# Patient Record
Sex: FEMALE | Race: WHITE | Employment: FULL TIME
[De-identification: names, ages, dates, MRNs, and addresses within clinical notes are randomized per-mention and may not be internally consistent; named-entity substitution may affect disease eponyms.]

---

## 2017-01-10 ENCOUNTER — OFFICE VISIT (OUTPATIENT)
Dept: NEUROLOGY | Facility: CLINIC | Age: 32
End: 2017-01-10

## 2017-01-10 VITALS
BODY MASS INDEX: 39.68 KG/M2 | WEIGHT: 293 LBS | DIASTOLIC BLOOD PRESSURE: 75 MMHG | SYSTOLIC BLOOD PRESSURE: 126 MMHG | RESPIRATION RATE: 17 BRPM | HEART RATE: 90 BPM | HEIGHT: 72 IN

## 2017-01-10 DIAGNOSIS — R51.9 FREQUENT HEADACHES: Primary | ICD-10-CM

## 2017-01-10 PROCEDURE — 99214 OFFICE O/P EST MOD 30 MIN: CPT | Performed by: PSYCHIATRY & NEUROLOGY

## 2017-02-01 ENCOUNTER — TELEPHONE (OUTPATIENT)
Dept: NEUROLOGY | Facility: CLINIC | Age: 32
End: 2017-02-01

## 2017-02-01 DIAGNOSIS — R51.9 FREQUENT HEADACHES: ICD-10-CM

## 2017-02-01 RX ORDER — ACETAZOLAMIDE 250 MG/1
500 TABLET ORAL 2 TIMES DAILY
Qty: 90 TABLET | Refills: 5 | Status: SHIPPED | OUTPATIENT
Start: 2017-02-01 | End: 2017-02-28 | Stop reason: SDUPTHER

## 2017-02-28 ENCOUNTER — OFFICE VISIT (OUTPATIENT)
Dept: NEUROLOGY | Facility: CLINIC | Age: 32
End: 2017-02-28

## 2017-02-28 VITALS — DIASTOLIC BLOOD PRESSURE: 94 MMHG | RESPIRATION RATE: 17 BRPM | SYSTOLIC BLOOD PRESSURE: 154 MMHG | HEART RATE: 94 BPM

## 2017-02-28 DIAGNOSIS — R51.9 FREQUENT HEADACHES: ICD-10-CM

## 2017-02-28 DIAGNOSIS — R51.9 FREQUENT HEADACHES: Primary | ICD-10-CM

## 2017-02-28 PROCEDURE — 99212 OFFICE O/P EST SF 10 MIN: CPT | Performed by: PSYCHIATRY & NEUROLOGY

## 2017-02-28 PROCEDURE — G8427 DOCREV CUR MEDS BY ELIG CLIN: HCPCS | Performed by: PSYCHIATRY & NEUROLOGY

## 2017-02-28 PROCEDURE — G8417 CALC BMI ABV UP PARAM F/U: HCPCS | Performed by: PSYCHIATRY & NEUROLOGY

## 2017-02-28 RX ORDER — ACETAZOLAMIDE 250 MG/1
500 TABLET ORAL 2 TIMES DAILY
Qty: 180 TABLET | Refills: 5 | Status: SHIPPED | OUTPATIENT
Start: 2017-02-28 | End: 2017-05-01 | Stop reason: ALTCHOICE

## 2017-03-03 ENCOUNTER — TELEPHONE (OUTPATIENT)
Dept: NEUROLOGY | Facility: CLINIC | Age: 32
End: 2017-03-03

## 2017-03-03 DIAGNOSIS — R51.9 FREQUENT HEADACHES: ICD-10-CM

## 2017-05-01 PROBLEM — G93.2 PSEUDOTUMOR CEREBRI: Status: ACTIVE | Noted: 2017-05-01

## 2018-10-22 ENCOUNTER — HOSPITAL ENCOUNTER (OUTPATIENT)
Age: 33
Discharge: HOME | End: 2018-10-22
Payer: COMMERCIAL

## 2018-10-22 ENCOUNTER — INITIAL PRENATAL (OUTPATIENT)
Dept: OBGYN | Age: 33
End: 2018-10-22

## 2018-10-22 ENCOUNTER — HOSPITAL ENCOUNTER (OUTPATIENT)
Age: 33
Setting detail: SPECIMEN
Discharge: HOME | End: 2018-10-22
Payer: COMMERCIAL

## 2018-10-22 VITALS — DIASTOLIC BLOOD PRESSURE: 84 MMHG | BODY MASS INDEX: 43.78 KG/M2 | SYSTOLIC BLOOD PRESSURE: 128 MMHG | WEIGHT: 293 LBS

## 2018-10-22 DIAGNOSIS — Z34.91 ENCOUNTER FOR SUPERVISION OF NORMAL PREGNANCY IN FIRST TRIMESTER, UNSPECIFIED GRAVIDITY: ICD-10-CM

## 2018-10-22 DIAGNOSIS — Z32.01 POSITIVE URINE PREGNANCY TEST: ICD-10-CM

## 2018-10-22 DIAGNOSIS — N91.2 AMENORRHEA: Primary | ICD-10-CM

## 2018-10-22 DIAGNOSIS — N91.2 AMENORRHEA: ICD-10-CM

## 2018-10-22 LAB
ABO/RH: NORMAL
AMPHETAMINE SCREEN URINE: NEGATIVE
ANTIBODY SCREEN: NEGATIVE
BARBITURATE SCREEN URINE: NEGATIVE
BENZODIAZEPINE SCREEN, URINE: NEGATIVE
BUPRENORPHINE URINE: NEGATIVE
CANNABINOID SCREEN URINE: NEGATIVE
COCAINE METABOLITE, URINE: NEGATIVE
CONTROL: ABNORMAL
HEPATITIS C ANTIBODY: NONREACTIVE
HIV AG/AB: NONREACTIVE
MDMA URINE: NORMAL
METHADONE SCREEN, URINE: NEGATIVE
METHAMPHETAMINE, URINE: NEGATIVE
OPIATES, URINE: NEGATIVE
OXYCODONE SCREEN URINE: NEGATIVE
PHENCYCLIDINE, URINE: NEGATIVE
PREGNANCY TEST URINE, POC: POSITIVE
PROPOXYPHENE, URINE: NEGATIVE
TEST INFORMATION: NORMAL
TRICYCLIC ANTIDEPRESSANTS, UR: NEGATIVE

## 2018-10-22 PROCEDURE — 86762 RUBELLA ANTIBODY: CPT

## 2018-10-22 PROCEDURE — 80306 DRUG TEST PRSMV INSTRMNT: CPT

## 2018-10-22 PROCEDURE — 86780 TREPONEMA PALLIDUM: CPT

## 2018-10-22 PROCEDURE — 87491 CHLMYD TRACH DNA AMP PROBE: CPT

## 2018-10-22 PROCEDURE — 36415 COLL VENOUS BLD VENIPUNCTURE: CPT

## 2018-10-22 PROCEDURE — 87591 N.GONORRHOEAE DNA AMP PROB: CPT

## 2018-10-22 PROCEDURE — 87340 HEPATITIS B SURFACE AG IA: CPT

## 2018-10-22 PROCEDURE — 85025 COMPLETE CBC W/AUTO DIFF WBC: CPT

## 2018-10-22 PROCEDURE — 86850 RBC ANTIBODY SCREEN: CPT

## 2018-10-22 PROCEDURE — G8417 CALC BMI ABV UP PARAM F/U: HCPCS | Performed by: ADVANCED PRACTICE MIDWIFE

## 2018-10-22 PROCEDURE — 86900 BLOOD TYPING SEROLOGIC ABO: CPT

## 2018-10-22 PROCEDURE — 0500F INITIAL PRENATAL CARE VISIT: CPT | Performed by: ADVANCED PRACTICE MIDWIFE

## 2018-10-22 PROCEDURE — G8427 DOCREV CUR MEDS BY ELIG CLIN: HCPCS | Performed by: ADVANCED PRACTICE MIDWIFE

## 2018-10-22 PROCEDURE — 87389 HIV-1 AG W/HIV-1&-2 AB AG IA: CPT

## 2018-10-22 PROCEDURE — 86901 BLOOD TYPING SEROLOGIC RH(D): CPT

## 2018-10-22 PROCEDURE — 87086 URINE CULTURE/COLONY COUNT: CPT

## 2018-10-22 PROCEDURE — 86803 HEPATITIS C AB TEST: CPT

## 2018-10-22 ASSESSMENT — PATIENT HEALTH QUESTIONNAIRE - PHQ9
SUM OF ALL RESPONSES TO PHQ QUESTIONS 1-9: 0
2. FEELING DOWN, DEPRESSED OR HOPELESS: 0
1. LITTLE INTEREST OR PLEASURE IN DOING THINGS: 0
SUM OF ALL RESPONSES TO PHQ9 QUESTIONS 1 & 2: 0
SUM OF ALL RESPONSES TO PHQ QUESTIONS 1-9: 0

## 2018-10-23 ENCOUNTER — TELEPHONE (OUTPATIENT)
Dept: OBGYN | Age: 33
End: 2018-10-23

## 2018-10-23 LAB
-: NORMAL
CULTURE: NORMAL
Lab: NORMAL
REASON FOR REJECTION: NORMAL
SPECIMEN DESCRIPTION: NORMAL
STATUS: NORMAL
ZZ NTE CLEAN UP: ORDERED TEST: NORMAL
ZZ NTE WITH NAME CLEAN UP: SPECIMEN SOURCE: NORMAL

## 2018-10-24 DIAGNOSIS — Z3A.08 8 WEEKS GESTATION OF PREGNANCY: Primary | ICD-10-CM

## 2018-10-24 LAB
ABSOLUTE EOS #: 0.04 K/UL (ref 0–0.44)
ABSOLUTE IMMATURE GRANULOCYTE: <0.03 K/UL (ref 0–0.3)
ABSOLUTE LYMPH #: 1.4 K/UL (ref 1.1–3.7)
ABSOLUTE MONO #: 0.5 K/UL (ref 0.1–1.2)
BASOPHILS # BLD: 1 % (ref 0–2)
BASOPHILS ABSOLUTE: 0.04 K/UL (ref 0–0.2)
DIFFERENTIAL TYPE: ABNORMAL
EOSINOPHILS RELATIVE PERCENT: 1 % (ref 1–4)
HCT VFR BLD CALC: 38.3 % (ref 36.3–47.1)
HEMOGLOBIN: 12 G/DL (ref 11.9–15.1)
HEPATITIS B SURFACE ANTIGEN: NONREACTIVE
IMMATURE GRANULOCYTES: 0 %
LYMPHOCYTES # BLD: 18 % (ref 24–43)
MCH RBC QN AUTO: 27.3 PG (ref 25.2–33.5)
MCHC RBC AUTO-ENTMCNC: 31.3 G/DL (ref 28.4–34.8)
MCV RBC AUTO: 87.2 FL (ref 82.6–102.9)
MONOCYTES # BLD: 7 % (ref 3–12)
NRBC AUTOMATED: 0 PER 100 WBC
PDW BLD-RTO: 13.7 % (ref 11.8–14.4)
PLATELET # BLD: 344 K/UL (ref 138–453)
PLATELET ESTIMATE: ABNORMAL
PMV BLD AUTO: 8.6 FL (ref 8.1–13.5)
RBC # BLD: 4.39 M/UL (ref 3.95–5.11)
RBC # BLD: ABNORMAL 10*6/UL
RUBV IGG SER QL: 161.7 IU/ML
SEG NEUTROPHILS: 73 % (ref 36–65)
SEGMENTED NEUTROPHILS ABSOLUTE COUNT: 5.67 K/UL (ref 1.5–8.1)
T. PALLIDUM, IGG: NONREACTIVE
WBC # BLD: 7.7 K/UL (ref 3.5–11.3)
WBC # BLD: ABNORMAL 10*3/UL

## 2018-11-07 ENCOUNTER — OFFICE VISIT (OUTPATIENT)
Dept: OBGYN | Age: 33
End: 2018-11-07
Payer: COMMERCIAL

## 2018-11-07 VITALS — WEIGHT: 293 LBS | DIASTOLIC BLOOD PRESSURE: 76 MMHG | BODY MASS INDEX: 42.99 KG/M2 | SYSTOLIC BLOOD PRESSURE: 118 MMHG

## 2018-11-07 DIAGNOSIS — E86.0 DEHYDRATION: Primary | ICD-10-CM

## 2018-11-07 DIAGNOSIS — O21.9 NAUSEA AND VOMITING IN PREGNANCY: Primary | ICD-10-CM

## 2018-11-07 LAB
BILIRUBIN, POC: NORMAL
BLOOD URINE, POC: NORMAL
CLARITY, POC: NORMAL
COLOR, POC: NORMAL
GLUCOSE URINE, POC: NEGATIVE
KETONES, POC: NORMAL
LEUKOCYTE EST, POC: NEGATIVE
NITRITE, POC: NEGATIVE
PH, POC: 5.5
PROTEIN, POC: NORMAL
SPECIFIC GRAVITY, POC: 1.02
UROBILINOGEN, POC: 0.2

## 2018-11-07 PROCEDURE — G8417 CALC BMI ABV UP PARAM F/U: HCPCS | Performed by: ADVANCED PRACTICE MIDWIFE

## 2018-11-07 PROCEDURE — G8428 CUR MEDS NOT DOCUMENT: HCPCS | Performed by: ADVANCED PRACTICE MIDWIFE

## 2018-11-07 PROCEDURE — 99211 OFF/OP EST MAY X REQ PHY/QHP: CPT | Performed by: ADVANCED PRACTICE MIDWIFE

## 2018-11-07 PROCEDURE — 81002 URINALYSIS NONAUTO W/O SCOPE: CPT | Performed by: ADVANCED PRACTICE MIDWIFE

## 2018-11-07 RX ORDER — PROMETHAZINE HYDROCHLORIDE 25 MG/1
25 TABLET ORAL 4 TIMES DAILY PRN
Qty: 20 TABLET | Refills: 1 | Status: SHIPPED | OUTPATIENT
Start: 2018-11-07 | End: 2018-11-14

## 2018-11-26 ENCOUNTER — PROCEDURE VISIT (OUTPATIENT)
Dept: OBGYN | Age: 33
End: 2018-11-26
Payer: COMMERCIAL

## 2018-11-26 ENCOUNTER — HOSPITAL ENCOUNTER (OUTPATIENT)
Age: 33
Setting detail: SPECIMEN
Discharge: HOME | End: 2018-11-26
Payer: COMMERCIAL

## 2018-11-26 DIAGNOSIS — O36.80X0 PREGNANCY WITH INCONCLUSIVE FETAL VIABILITY, NOT APPLICABLE OR UNSPECIFIED FETUS: Primary | ICD-10-CM

## 2018-11-26 DIAGNOSIS — Z34.02 ENCOUNTER FOR SUPERVISION OF NORMAL FIRST PREGNANCY IN SECOND TRIMESTER: ICD-10-CM

## 2018-11-26 DIAGNOSIS — R82.90 ABNORMAL URINE: ICD-10-CM

## 2018-11-26 DIAGNOSIS — Z3A.10 10 WEEKS GESTATION OF PREGNANCY: ICD-10-CM

## 2018-11-26 DIAGNOSIS — Z34.02 ENCOUNTER FOR SUPERVISION OF NORMAL FIRST PREGNANCY IN SECOND TRIMESTER: Primary | ICD-10-CM

## 2018-11-26 LAB
CRL: 3.3 CM
SAC DIAMETER: NORMAL CM

## 2018-11-26 PROCEDURE — 87491 CHLMYD TRACH DNA AMP PROBE: CPT

## 2018-11-26 PROCEDURE — 87591 N.GONORRHOEAE DNA AMP PROB: CPT

## 2018-11-26 PROCEDURE — 87086 URINE CULTURE/COLONY COUNT: CPT

## 2018-11-26 PROCEDURE — 76817 TRANSVAGINAL US OBSTETRIC: CPT | Performed by: OBSTETRICS & GYNECOLOGY

## 2018-11-27 LAB
CULTURE: NORMAL
Lab: NORMAL
SPECIMEN DESCRIPTION: NORMAL
STATUS: NORMAL

## 2018-11-28 LAB
C. TRACHOMATIS DNA ,URINE: NEGATIVE
N. GONORRHOEAE DNA, URINE: NEGATIVE

## 2018-12-14 ENCOUNTER — ROUTINE PRENATAL (OUTPATIENT)
Dept: OBGYN | Age: 33
End: 2018-12-14

## 2018-12-14 VITALS — WEIGHT: 293 LBS | BODY MASS INDEX: 42.31 KG/M2 | DIASTOLIC BLOOD PRESSURE: 86 MMHG | SYSTOLIC BLOOD PRESSURE: 138 MMHG

## 2018-12-14 DIAGNOSIS — Z3A.12 12 WEEKS GESTATION OF PREGNANCY: ICD-10-CM

## 2018-12-14 DIAGNOSIS — Z34.01 ENCOUNTER FOR SUPERVISION OF NORMAL FIRST PREGNANCY IN FIRST TRIMESTER: Primary | ICD-10-CM

## 2018-12-14 PROCEDURE — 0502F SUBSEQUENT PRENATAL CARE: CPT | Performed by: ADVANCED PRACTICE MIDWIFE

## 2018-12-14 RX ORDER — ONDANSETRON 4 MG/1
4 TABLET, FILM COATED ORAL EVERY 8 HOURS PRN
COMMUNITY
End: 2019-01-07 | Stop reason: SDUPTHER

## 2018-12-14 RX ORDER — RANITIDINE 150 MG/1
150 TABLET ORAL 2 TIMES DAILY
Status: ON HOLD | COMMUNITY
End: 2019-03-11

## 2018-12-17 NOTE — PROGRESS NOTES
Fum-FA-DHA (PRENATAL 1 PO), ranitidine, and ondansetron. Return in about 4 weeks (around 1/11/2019) for ob. There are no Patient Instructions on file for this visit.           Charli Officer Luis Fernando,12/16/2018 9:56 PM

## 2018-12-27 RX ORDER — ONDANSETRON 4 MG/1
4 TABLET, FILM COATED ORAL EVERY 8 HOURS PRN
Qty: 30 TABLET | Refills: 0 | Status: ON HOLD | OUTPATIENT
Start: 2018-12-27 | End: 2019-06-27 | Stop reason: HOSPADM

## 2019-01-07 ENCOUNTER — ROUTINE PRENATAL (OUTPATIENT)
Dept: OBGYN | Age: 34
End: 2019-01-07

## 2019-01-07 VITALS — BODY MASS INDEX: 42.72 KG/M2 | SYSTOLIC BLOOD PRESSURE: 124 MMHG | WEIGHT: 293 LBS | DIASTOLIC BLOOD PRESSURE: 84 MMHG

## 2019-01-07 DIAGNOSIS — Z36.89 ENCOUNTER FOR OTHER SPECIFIED ANTENATAL SCREENING: ICD-10-CM

## 2019-01-07 DIAGNOSIS — Z34.02 ENCOUNTER FOR SUPERVISION OF NORMAL FIRST PREGNANCY IN SECOND TRIMESTER: Primary | ICD-10-CM

## 2019-01-07 DIAGNOSIS — Z3A.16 16 WEEKS GESTATION OF PREGNANCY: ICD-10-CM

## 2019-01-07 PROCEDURE — 0502F SUBSEQUENT PRENATAL CARE: CPT | Performed by: ADVANCED PRACTICE MIDWIFE

## 2019-01-07 RX ORDER — CALCIUM CARBONATE 200(500)MG
1 TABLET,CHEWABLE ORAL DAILY
COMMUNITY
End: 2019-09-10

## 2019-01-07 RX ORDER — LANOLIN ALCOHOL/MO/W.PET/CERES
50 CREAM (GRAM) TOPICAL DAILY
Status: ON HOLD | COMMUNITY
End: 2019-06-27 | Stop reason: HOSPADM

## 2019-01-14 DIAGNOSIS — Z3A.12 12 WEEKS GESTATION OF PREGNANCY: ICD-10-CM

## 2019-01-14 DIAGNOSIS — Z36.89 ENCOUNTER FOR OTHER SPECIFIED ANTENATAL SCREENING: ICD-10-CM

## 2019-02-04 ENCOUNTER — ROUTINE PRENATAL (OUTPATIENT)
Dept: OBGYN | Age: 34
End: 2019-02-04
Payer: COMMERCIAL

## 2019-02-04 VITALS — BODY MASS INDEX: 42.18 KG/M2 | SYSTOLIC BLOOD PRESSURE: 120 MMHG | DIASTOLIC BLOOD PRESSURE: 82 MMHG | WEIGHT: 293 LBS

## 2019-02-04 DIAGNOSIS — O99.212 OBESITY AFFECTING PREGNANCY IN SECOND TRIMESTER: ICD-10-CM

## 2019-02-04 DIAGNOSIS — Z3A.20 20 WEEKS GESTATION OF PREGNANCY: ICD-10-CM

## 2019-02-04 DIAGNOSIS — Z36.3 ANTENATAL SCREENING FOR MALFORMATION USING ULTRASONICS: Primary | ICD-10-CM

## 2019-02-04 LAB
ABDOMINAL CIRCUMFERENCE: 15.8 CM
BIPARIETAL DIAMETER: 5 CM
ESTIMATED FETAL WEIGHT: 367 GRAMS
FEMORAL DIAMETER: NORMAL CM
HC/AC: 1.13
HEAD CIRCUMFERENCE: 17.9 CM

## 2019-02-04 PROCEDURE — 0502F SUBSEQUENT PRENATAL CARE: CPT | Performed by: ADVANCED PRACTICE MIDWIFE

## 2019-02-04 PROCEDURE — 76805 OB US >/= 14 WKS SNGL FETUS: CPT | Performed by: OBSTETRICS & GYNECOLOGY

## 2019-03-04 ENCOUNTER — ROUTINE PRENATAL (OUTPATIENT)
Dept: OBGYN | Age: 34
End: 2019-03-04

## 2019-03-04 VITALS — SYSTOLIC BLOOD PRESSURE: 126 MMHG | BODY MASS INDEX: 42.86 KG/M2 | DIASTOLIC BLOOD PRESSURE: 80 MMHG | WEIGHT: 293 LBS

## 2019-03-04 DIAGNOSIS — Z34.02 ENCOUNTER FOR SUPERVISION OF NORMAL FIRST PREGNANCY IN SECOND TRIMESTER: Primary | ICD-10-CM

## 2019-03-04 DIAGNOSIS — Z3A.24 24 WEEKS GESTATION OF PREGNANCY: ICD-10-CM

## 2019-03-04 PROCEDURE — 0502F SUBSEQUENT PRENATAL CARE: CPT | Performed by: ADVANCED PRACTICE MIDWIFE

## 2019-03-11 ENCOUNTER — APPOINTMENT (OUTPATIENT)
Dept: ULTRASOUND IMAGING | Age: 34
End: 2019-03-11
Payer: COMMERCIAL

## 2019-03-11 ENCOUNTER — HOSPITAL ENCOUNTER (OUTPATIENT)
Age: 34
Discharge: HOME | End: 2019-03-11
Attending: ADVANCED PRACTICE MIDWIFE | Admitting: ADVANCED PRACTICE MIDWIFE
Payer: COMMERCIAL

## 2019-03-11 ENCOUNTER — TELEPHONE (OUTPATIENT)
Dept: OBGYN | Age: 34
End: 2019-03-11

## 2019-03-11 VITALS
TEMPERATURE: 97.8 F | SYSTOLIC BLOOD PRESSURE: 131 MMHG | RESPIRATION RATE: 16 BRPM | HEART RATE: 79 BPM | DIASTOLIC BLOOD PRESSURE: 63 MMHG

## 2019-03-11 LAB
-: ABNORMAL
AMORPHOUS: ABNORMAL
BACTERIA: ABNORMAL
BILIRUBIN URINE: NEGATIVE
CASTS UA: ABNORMAL /LPF
COLOR: YELLOW
COMMENT UA: ABNORMAL
CRYSTALS, UA: ABNORMAL /HPF
CRYSTALS, UA: ABNORMAL /HPF
EPITHELIAL CELLS UA: ABNORMAL /HPF (ref 0–25)
GLUCOSE URINE: NEGATIVE
KETONES, URINE: NEGATIVE
LEUKOCYTE ESTERASE, URINE: ABNORMAL
MUCUS: ABNORMAL
NITRITE, URINE: NEGATIVE
OTHER OBSERVATIONS UA: ABNORMAL
PH UA: 6 (ref 5–9)
PROTEIN UA: NEGATIVE
RBC UA: ABNORMAL /HPF (ref 0–2)
RENAL EPITHELIAL, UA: ABNORMAL /HPF
SPECIFIC GRAVITY UA: >1.03 (ref 1.01–1.02)
TRICHOMONAS: ABNORMAL
TURBIDITY: CLEAR
URINE HGB: ABNORMAL
UROBILINOGEN, URINE: NORMAL
WBC UA: ABNORMAL /HPF (ref 0–5)
YEAST: ABNORMAL

## 2019-03-11 PROCEDURE — 81001 URINALYSIS AUTO W/SCOPE: CPT

## 2019-03-11 PROCEDURE — 87086 URINE CULTURE/COLONY COUNT: CPT

## 2019-03-11 PROCEDURE — 76817 TRANSVAGINAL US OBSTETRIC: CPT

## 2019-03-11 PROCEDURE — 6370000000 HC RX 637 (ALT 250 FOR IP): Performed by: ADVANCED PRACTICE MIDWIFE

## 2019-03-11 RX ORDER — PANTOPRAZOLE SODIUM 40 MG/1
40 TABLET, DELAYED RELEASE ORAL DAILY
COMMUNITY

## 2019-03-11 RX ORDER — CEPHALEXIN 250 MG/1
500 CAPSULE ORAL ONCE
Status: COMPLETED | OUTPATIENT
Start: 2019-03-11 | End: 2019-03-11

## 2019-03-11 RX ADMIN — CEPHALEXIN 500 MG: 250 CAPSULE ORAL at 22:15

## 2019-03-12 ENCOUNTER — TELEPHONE (OUTPATIENT)
Dept: OBGYN | Age: 34
End: 2019-03-12

## 2019-03-12 DIAGNOSIS — R31.9 HEMATURIA, UNSPECIFIED TYPE: Primary | ICD-10-CM

## 2019-03-12 DIAGNOSIS — R82.90 ABNORMAL URINALYSIS: Primary | ICD-10-CM

## 2019-03-12 LAB
CULTURE: NORMAL
Lab: NORMAL
SPECIMEN DESCRIPTION: NORMAL

## 2019-03-12 RX ORDER — CEPHALEXIN 500 MG/1
500 CAPSULE ORAL 3 TIMES DAILY
Qty: 21 CAPSULE | Refills: 0 | Status: SHIPPED | OUTPATIENT
Start: 2019-03-12 | End: 2019-03-19

## 2019-03-19 DIAGNOSIS — R31.9 HEMATURIA, UNSPECIFIED TYPE: ICD-10-CM

## 2019-04-04 ENCOUNTER — ROUTINE PRENATAL (OUTPATIENT)
Dept: OBGYN | Age: 34
End: 2019-04-04

## 2019-04-04 VITALS — BODY MASS INDEX: 43.67 KG/M2 | WEIGHT: 293 LBS | DIASTOLIC BLOOD PRESSURE: 78 MMHG | SYSTOLIC BLOOD PRESSURE: 126 MMHG

## 2019-04-04 DIAGNOSIS — Z34.03 ENCOUNTER FOR SUPERVISION OF NORMAL FIRST PREGNANCY IN THIRD TRIMESTER: Primary | ICD-10-CM

## 2019-04-04 DIAGNOSIS — Z3A.28 28 WEEKS GESTATION OF PREGNANCY: ICD-10-CM

## 2019-04-04 LAB
CHP ED QC CHECK: NORMAL
GLUCOSE BLD-MCNC: 128 MG/DL
HGB, POC: 10

## 2019-04-04 PROCEDURE — 0502F SUBSEQUENT PRENATAL CARE: CPT | Performed by: ADVANCED PRACTICE MIDWIFE

## 2019-04-04 NOTE — PROGRESS NOTES
Kiara Garcia is here at 28w5d for:    Chief Complaint   Patient presents with    Routine Prenatal Visit     1 hour GTT. Estimated Due Date: Estimated Date of Delivery: 19    OB History    Para Term  AB Living   1             SAB TAB Ectopic Molar Multiple Live Births                    # Outcome Date GA Lbr Wallace/2nd Weight Sex Delivery Anes PTL Lv   1 Current                 Past Medical History:   Diagnosis Date    Asthma     Attention deficit hyperactivity disorder     GERD (gastroesophageal reflux disease)     Hypertension     intercranial hypertension     Pseudotumor cerebri     Psychiatric problem     Anxiety       Past Surgical History:   Procedure Laterality Date    CHOLECYSTECTOMY      2018       Social History     Tobacco Use   Smoking Status Never Smoker   Smokeless Tobacco Never Used        Social History     Substance and Sexual Activity   Alcohol Use No       No results found for this visit on 19. Vitals:  /78   Wt (!) 322 lb (146.1 kg)   LMP 2018 (Approximate)   BMI 43.67 kg/m²   Estimated body mass index is 43.67 kg/m² as calculated from the following:    Height as of 17: 6' (1.829 m). Weight as of this encounter: 322 lb (146.1 kg). HPI: here for routine ob visit and gtt     PT denies fever, chills, nausea and vomiting       Abdomen: enlarged, gravid, soft, nontender     Results reviewed today:    No results found for this visit on 19. See prenatal vital sign section and fetal assessment section    ASSESSMENT & Plan    Diagnosis Orders   1. Encounter for supervision of normal first pregnancy in third trimester     2. 28 weeks gestation of pregnancy               I am having Kiara Garcia maintain her albuterol sulfate HFA, Prenatal MV-Min-Fe Fum-FA-DHA (PRENATAL 1 PO), ondansetron, vitamin B-6, calcium carbonate, and pantoprazole. Return in about 2 weeks (around 2019) for ob 30wkUS+tdap.     There are no Patient Instructions on file for this visit.           Amos Gould,4/4/2019 3:34 PM

## 2019-04-15 ENCOUNTER — TELEPHONE (OUTPATIENT)
Dept: OBGYN | Age: 34
End: 2019-04-15

## 2019-04-15 DIAGNOSIS — D64.9 ANEMIA, UNSPECIFIED TYPE: Primary | ICD-10-CM

## 2019-04-15 RX ORDER — FERROUS SULFATE 325(65) MG
325 TABLET ORAL 2 TIMES DAILY
Qty: 60 TABLET | Refills: 5 | Status: ON HOLD | OUTPATIENT
Start: 2019-04-15 | End: 2019-06-27 | Stop reason: HOSPADM

## 2019-04-15 RX ORDER — FERROUS SULFATE 325(65) MG
325 TABLET ORAL 2 TIMES DAILY
Qty: 60 TABLET | Refills: 0 | Status: SHIPPED | OUTPATIENT
Start: 2019-04-15 | End: 2019-09-10 | Stop reason: ALTCHOICE

## 2019-04-15 NOTE — TELEPHONE ENCOUNTER
Patient concerned about anemia. Was with cousin who said her gums and lips looked pale. Prescription for iron?

## 2019-04-15 NOTE — TELEPHONE ENCOUNTER
Patient called and said she needs 30 day prescription sent to [facility] and additional prescription sent to [facility].

## 2019-04-15 NOTE — TELEPHONE ENCOUNTER
Make sure she's taking a PNV with iron.  So that cant be gummies, if she is then add ferrous sulfate 325 BID

## 2019-04-15 NOTE — TELEPHONE ENCOUNTER
Left patient message with advice and asking her to call me to let me know what pharmacy to send prescription to.

## 2019-04-22 ENCOUNTER — ROUTINE PRENATAL (OUTPATIENT)
Dept: OBGYN | Age: 34
End: 2019-04-22
Payer: COMMERCIAL

## 2019-04-22 VITALS — BODY MASS INDEX: 44.21 KG/M2 | SYSTOLIC BLOOD PRESSURE: 124 MMHG | DIASTOLIC BLOOD PRESSURE: 80 MMHG | WEIGHT: 293 LBS

## 2019-04-22 DIAGNOSIS — Z34.03 ENCOUNTER FOR SUPERVISION OF NORMAL FIRST PREGNANCY IN THIRD TRIMESTER: ICD-10-CM

## 2019-04-22 DIAGNOSIS — O26.843 UTERINE SIZE-DATE DISCREPANCY IN THIRD TRIMESTER: Primary | ICD-10-CM

## 2019-04-22 DIAGNOSIS — Z3A.31 31 WEEKS GESTATION OF PREGNANCY: ICD-10-CM

## 2019-04-22 LAB
ABDOMINAL CIRCUMFERENCE: 27.5 CM
BIPARIETAL DIAMETER: 8.4 CM
ESTIMATED FETAL WEIGHT: 1950 GRAMS
FEMORAL DIAMETER: NORMAL CM
HC/AC: 1.1
HEAD CIRCUMFERENCE: 30.3 CM

## 2019-04-22 PROCEDURE — 76816 OB US FOLLOW-UP PER FETUS: CPT | Performed by: OBSTETRICS & GYNECOLOGY

## 2019-04-22 PROCEDURE — 0502F SUBSEQUENT PRENATAL CARE: CPT | Performed by: ADVANCED PRACTICE MIDWIFE

## 2019-04-22 NOTE — PROGRESS NOTES
Kiara Garcia is here at 31w2d for:    Chief Complaint   Patient presents with    Routine Prenatal Visit     Follow up in house ultrasound. Thick white vaginal discharge , no itch or irritation. Vomits 3-4 times pe week. Estimated Due Date: Estimated Date of Delivery: 19    OB History    Para Term  AB Living   1             SAB TAB Ectopic Molar Multiple Live Births                    # Outcome Date GA Lbr Wallace/2nd Weight Sex Delivery Anes PTL Lv   1 Current                 Past Medical History:   Diagnosis Date    Asthma     Attention deficit hyperactivity disorder     GERD (gastroesophageal reflux disease)     Hypertension     intercranial hypertension     Pseudotumor cerebri     Psychiatric problem     Anxiety       Past Surgical History:   Procedure Laterality Date    CHOLECYSTECTOMY      2018       Social History     Tobacco Use   Smoking Status Never Smoker   Smokeless Tobacco Never Used        Social History     Substance and Sexual Activity   Alcohol Use No       Results for orders placed or performed in visit on 19    OB Follow Up Transabdominal Approach   Result Value Ref Range    Biparietal Diameter 8.4 cm    Abdominal Circumference 27.5 cm    Femoral Diameter  cm    Head Circumference 30.3 cm    HC/AC 1.10     Estimated Fetal Weight 1,950 grams       Vitals:  /80   Wt (!) 326 lb (147.9 kg)   LMP 2018 (Approximate)   BMI 44.21 kg/m²   Estimated body mass index is 44.21 kg/m² as calculated from the following:    Height as of 17: 6' (1.829 m). Weight as of this encounter: 326 lb (147.9 kg). HPI: here for routine ob visit, c/o nipples leaking clear.   Has started Fe     PT denies fever, chills, nausea and vomiting       Abdomen: enlarged, gravid, soft, nontender     Results reviewed today:    Results for orders placed or performed in visit on 19    OB Follow Up Transabdominal Approach   Result Value Ref Range    Biparietal Diameter 8.4 cm    Abdominal Circumference 27.5 cm    Femoral Diameter  cm    Head Circumference 30.3 cm    HC/AC 1.10     Estimated Fetal Weight 1,950 grams       See prenatal vital sign section and fetal assessment section    ASSESSMENT & Plan    Diagnosis Orders   1. Uterine size-date discrepancy in third trimester  US OB Follow Up Transabdominal Approach   2. BMI 40.0-44.9, adult (HCC)  US OB Follow Up Transabdominal Approach   3. 31 weeks gestation of pregnancy  US OB Follow Up Transabdominal Approach   4. Encounter for supervision of normal first pregnancy in third trimester               I am having Isabella Kimball maintain her albuterol sulfate HFA, Prenatal MV-Min-Fe Fum-FA-DHA (PRENATAL 1 PO), ondansetron, vitamin B-6, calcium carbonate, pantoprazole, ferrous sulfate, and ferrous sulfate. Return in about 2 weeks (around 5/6/2019) for ob. There are no Patient Instructions on file for this visit.           Corinne Gould,4/22/2019 3:25 PM

## 2019-05-06 ENCOUNTER — HOSPITAL ENCOUNTER (OUTPATIENT)
Age: 34
Setting detail: SPECIMEN
Discharge: HOME | End: 2019-05-06
Payer: COMMERCIAL

## 2019-05-06 ENCOUNTER — ROUTINE PRENATAL (OUTPATIENT)
Dept: OBGYN | Age: 34
End: 2019-05-06

## 2019-05-06 VITALS — SYSTOLIC BLOOD PRESSURE: 132 MMHG | BODY MASS INDEX: 44.76 KG/M2 | WEIGHT: 293 LBS | DIASTOLIC BLOOD PRESSURE: 80 MMHG

## 2019-05-06 DIAGNOSIS — O26.899 PELVIC PRESSURE IN PREGNANCY: ICD-10-CM

## 2019-05-06 DIAGNOSIS — R10.2 PELVIC PRESSURE IN PREGNANCY: ICD-10-CM

## 2019-05-06 DIAGNOSIS — Z34.03 ENCOUNTER FOR SUPERVISION OF NORMAL FIRST PREGNANCY IN THIRD TRIMESTER: Primary | ICD-10-CM

## 2019-05-06 DIAGNOSIS — Z3A.33 33 WEEKS GESTATION OF PREGNANCY: ICD-10-CM

## 2019-05-06 LAB
-: ABNORMAL
AMORPHOUS: ABNORMAL
BACTERIA: ABNORMAL
BILIRUBIN URINE: NEGATIVE
CASTS UA: ABNORMAL /LPF
COLOR: YELLOW
COMMENT UA: ABNORMAL
CRYSTALS, UA: ABNORMAL /HPF
CRYSTALS, UA: ABNORMAL /HPF
EPITHELIAL CELLS UA: ABNORMAL /HPF (ref 0–25)
GLUCOSE URINE: NEGATIVE
KETONES, URINE: NEGATIVE
LEUKOCYTE ESTERASE, URINE: ABNORMAL
MUCUS: ABNORMAL
NITRITE, URINE: NEGATIVE
OTHER OBSERVATIONS UA: ABNORMAL
PH UA: 6 (ref 5–9)
PROTEIN UA: ABNORMAL
RBC UA: ABNORMAL /HPF (ref 0–2)
RENAL EPITHELIAL, UA: ABNORMAL /HPF
SPECIFIC GRAVITY UA: 1.02 (ref 1.01–1.02)
TRICHOMONAS: ABNORMAL
TURBIDITY: CLEAR
URINE HGB: ABNORMAL
UROBILINOGEN, URINE: NORMAL
WBC UA: ABNORMAL /HPF (ref 0–5)
YEAST: ABNORMAL

## 2019-05-06 PROCEDURE — 87086 URINE CULTURE/COLONY COUNT: CPT

## 2019-05-06 PROCEDURE — 81001 URINALYSIS AUTO W/SCOPE: CPT

## 2019-05-06 PROCEDURE — 0502F SUBSEQUENT PRENATAL CARE: CPT | Performed by: ADVANCED PRACTICE MIDWIFE

## 2019-05-06 NOTE — PROGRESS NOTES
Qi Doe is here at 33w2d for:    Chief Complaint   Patient presents with    Routine Prenatal Visit     C/O \"achy\" pelvis. Unable to obtain urine. Estimated Due Date: Estimated Date of Delivery: 19    OB History    Para Term  AB Living   1             SAB TAB Ectopic Molar Multiple Live Births                    # Outcome Date GA Lbr Wallace/2nd Weight Sex Delivery Anes PTL Lv   1 Current                 Past Medical History:   Diagnosis Date    Asthma     Attention deficit hyperactivity disorder     GERD (gastroesophageal reflux disease)     Hypertension     intercranial hypertension     Pseudotumor cerebri     Psychiatric problem     Anxiety       Past Surgical History:   Procedure Laterality Date    CHOLECYSTECTOMY      2018       Social History     Tobacco Use   Smoking Status Never Smoker   Smokeless Tobacco Never Used        Social History     Substance and Sexual Activity   Alcohol Use No       No results found for this visit on 19. Vitals:  /80   Wt (!) 330 lb (149.7 kg)   LMP 2018 (Approximate)   BMI 44.76 kg/m²   Estimated body mass index is 44.76 kg/m² as calculated from the following:    Height as of 17: 6' (1.829 m). Weight as of this encounter: 330 lb (149.7 kg). HPI: here for routine ob visit, c/o pelvic pressure     PT denies fever, chills, nausea and vomiting       Abdomen: enlarged, gravid, soft, nontender     Results reviewed today:    No results found for this visit on 19. See prenatal vital sign section and fetal assessment section    ASSESSMENT & Plan    Diagnosis Orders   1. Encounter for supervision of normal first pregnancy in third trimester     2. 33 weeks gestation of pregnancy     3.  Pelvic pressure in pregnancy  Urinalysis With Microscopic    Urine Culture             I am having Qi Doe maintain her albuterol sulfate HFA, Prenatal MV-Min-Fe Fum-FA-DHA (PRENATAL 1 PO), ondansetron, vitamin B-6,

## 2019-05-07 DIAGNOSIS — N39.0 URINARY TRACT INFECTION WITHOUT HEMATURIA, SITE UNSPECIFIED: Primary | ICD-10-CM

## 2019-05-07 LAB
CULTURE: NORMAL
Lab: NORMAL
SPECIMEN DESCRIPTION: NORMAL

## 2019-05-07 RX ORDER — CEPHALEXIN 500 MG/1
500 CAPSULE ORAL 3 TIMES DAILY
Qty: 21 CAPSULE | Refills: 0 | Status: SHIPPED | OUTPATIENT
Start: 2019-05-07 | End: 2019-05-14

## 2019-05-21 ENCOUNTER — ROUTINE PRENATAL (OUTPATIENT)
Dept: OBGYN | Age: 34
End: 2019-05-21
Payer: COMMERCIAL

## 2019-05-21 ENCOUNTER — HOSPITAL ENCOUNTER (OUTPATIENT)
Age: 34
Setting detail: SPECIMEN
Discharge: HOME | End: 2019-05-21
Payer: COMMERCIAL

## 2019-05-21 VITALS — WEIGHT: 293 LBS | BODY MASS INDEX: 45.16 KG/M2 | DIASTOLIC BLOOD PRESSURE: 78 MMHG | SYSTOLIC BLOOD PRESSURE: 130 MMHG

## 2019-05-21 DIAGNOSIS — E66.01 MATERNAL MORBID OBESITY, ANTEPARTUM, THIRD TRIMESTER (HCC): ICD-10-CM

## 2019-05-21 DIAGNOSIS — O26.893 PELVIC PRESSURE IN PREGNANCY, THIRD TRIMESTER: ICD-10-CM

## 2019-05-21 DIAGNOSIS — Z3A.35 35 WEEKS GESTATION OF PREGNANCY: ICD-10-CM

## 2019-05-21 DIAGNOSIS — O99.213 MATERNAL MORBID OBESITY, ANTEPARTUM, THIRD TRIMESTER (HCC): ICD-10-CM

## 2019-05-21 DIAGNOSIS — Z34.03 ENCOUNTER FOR SUPERVISION OF NORMAL FIRST PREGNANCY IN THIRD TRIMESTER: ICD-10-CM

## 2019-05-21 DIAGNOSIS — R10.2 PELVIC PRESSURE IN PREGNANCY, THIRD TRIMESTER: ICD-10-CM

## 2019-05-21 DIAGNOSIS — Z3A.35 35 WEEKS GESTATION OF PREGNANCY: Primary | ICD-10-CM

## 2019-05-21 LAB
ABDOMINAL CIRCUMFERENCE: 32.85 CM
BIPARIETAL DIAMETER: 9.28 CM
ESTIMATED FETAL WEIGHT: 3003 GRAMS
FEMORAL DIAMETER: NORMAL CM
HC/AC: 1
HEAD CIRCUMFERENCE: 32.99 CM

## 2019-05-21 PROCEDURE — 0502F SUBSEQUENT PRENATAL CARE: CPT | Performed by: ADVANCED PRACTICE MIDWIFE

## 2019-05-21 PROCEDURE — 76815 OB US LIMITED FETUS(S): CPT | Performed by: OBSTETRICS & GYNECOLOGY

## 2019-05-21 PROCEDURE — 87081 CULTURE SCREEN ONLY: CPT

## 2019-05-21 NOTE — PROGRESS NOTES
Estimated Fetal Weight 3,003 grams       See prenatal vital sign section and fetal assessment section    ASSESSMENT & Plan    Diagnosis Orders   1. 35 weeks gestation of pregnancy  US Fetal Biophysical Profile WO Non Stress Testing    US OB 1 or More Fetus Limited    Strep B Screen, Vaginal / Rectal   2. Pelvic pressure in pregnancy, third trimester  US Fetal Biophysical Profile WO Non Stress Testing    US OB 1 or More Fetus Limited   3. Encounter for supervision of normal first pregnancy in third trimester     4. Maternal morbid obesity, antepartum, third trimester ([facility] [address]               I am having Patsy Sox maintain her albuterol sulfate HFA, Prenatal MV-Min-Fe Fum-FA-DHA (PRENATAL 1 PO), ondansetron, vitamin B-6, calcium carbonate, pantoprazole, ferrous sulfate, and ferrous sulfate. Return in about 1 week (around 5/28/2019) for ob, ob sono for bpp. There are no Patient Instructions on file for this visit.           Olivia Gould,5/21/2019 1:23 PM

## 2019-05-24 LAB
CULTURE: NORMAL
Lab: NORMAL
SPECIMEN DESCRIPTION: NORMAL

## 2019-05-28 ENCOUNTER — HOSPITAL ENCOUNTER (OUTPATIENT)
Dept: ULTRASOUND IMAGING | Age: 34
Discharge: HOME | End: 2019-05-30
Payer: COMMERCIAL

## 2019-05-28 ENCOUNTER — ROUTINE PRENATAL (OUTPATIENT)
Dept: OBGYN | Age: 34
End: 2019-05-28

## 2019-05-28 VITALS — SYSTOLIC BLOOD PRESSURE: 136 MMHG | BODY MASS INDEX: 45.57 KG/M2 | WEIGHT: 293 LBS | DIASTOLIC BLOOD PRESSURE: 84 MMHG

## 2019-05-28 DIAGNOSIS — Z34.03 ENCOUNTER FOR SUPERVISION OF NORMAL FIRST PREGNANCY IN THIRD TRIMESTER: ICD-10-CM

## 2019-05-28 DIAGNOSIS — Z3A.36 36 WEEKS GESTATION OF PREGNANCY: ICD-10-CM

## 2019-05-28 DIAGNOSIS — N60.02 MONTGOMERY GLAND CYST OF LEFT BREAST: ICD-10-CM

## 2019-05-28 DIAGNOSIS — O99.013 ANEMIA AFFECTING PREGNANCY IN THIRD TRIMESTER: Primary | ICD-10-CM

## 2019-05-28 LAB — HGB, POC: 12

## 2019-05-28 PROCEDURE — 0502F SUBSEQUENT PRENATAL CARE: CPT | Performed by: ADVANCED PRACTICE MIDWIFE

## 2019-05-28 PROCEDURE — 76819 FETAL BIOPHYS PROFIL W/O NST: CPT

## 2019-05-28 NOTE — PROGRESS NOTES
4. Canales gland cyst of left breast               I am having Kaylee Rice maintain her albuterol sulfate HFA, Prenatal MV-Min-Fe Fum-FA-DHA (PRENATAL 1 PO), ondansetron, vitamin B-6, calcium carbonate, pantoprazole, ferrous sulfate, and ferrous sulfate. Return in about 1 week (around 6/4/2019) for ob no bpp. There are no Patient Instructions on file for this visit.           Syed Gould,5/28/2019 1:54 PM

## 2019-06-05 ENCOUNTER — ROUTINE PRENATAL (OUTPATIENT)
Dept: OBGYN | Age: 34
End: 2019-06-05

## 2019-06-05 VITALS — BODY MASS INDEX: 45.98 KG/M2 | WEIGHT: 293 LBS | SYSTOLIC BLOOD PRESSURE: 132 MMHG | DIASTOLIC BLOOD PRESSURE: 84 MMHG

## 2019-06-05 DIAGNOSIS — Z3A.37 37 WEEKS GESTATION OF PREGNANCY: ICD-10-CM

## 2019-06-05 DIAGNOSIS — Z34.03 ENCOUNTER FOR SUPERVISION OF NORMAL FIRST PREGNANCY IN THIRD TRIMESTER: Primary | ICD-10-CM

## 2019-06-05 PROCEDURE — 0502F SUBSEQUENT PRENATAL CARE: CPT | Performed by: ADVANCED PRACTICE MIDWIFE

## 2019-06-05 NOTE — PROGRESS NOTES
6/12/2019) for ob. There are no Patient Instructions on file for this visit.           Cass Gould,6/5/2019 12:17 PM

## 2019-06-12 ENCOUNTER — ROUTINE PRENATAL (OUTPATIENT)
Dept: OBGYN | Age: 34
End: 2019-06-12

## 2019-06-12 VITALS — BODY MASS INDEX: 46.25 KG/M2 | DIASTOLIC BLOOD PRESSURE: 84 MMHG | WEIGHT: 293 LBS | SYSTOLIC BLOOD PRESSURE: 124 MMHG

## 2019-06-12 DIAGNOSIS — Z34.03 ENCOUNTER FOR SUPERVISION OF NORMAL FIRST PREGNANCY IN THIRD TRIMESTER: Primary | ICD-10-CM

## 2019-06-12 DIAGNOSIS — Z3A.38 38 WEEKS GESTATION OF PREGNANCY: ICD-10-CM

## 2019-06-12 PROCEDURE — 0502F SUBSEQUENT PRENATAL CARE: CPT | Performed by: ADVANCED PRACTICE MIDWIFE

## 2019-06-12 NOTE — PROGRESS NOTES
Sherly Turpin is here at 38w4d for:    Chief Complaint   Patient presents with    Routine Prenatal Visit     C/O nausea. Estimated Due Date: Estimated Date of Delivery: 19    OB History    Para Term  AB Living   1             SAB TAB Ectopic Molar Multiple Live Births                    # Outcome Date GA Lbr Wallace/2nd Weight Sex Delivery Anes PTL Lv   1 Current                 Past Medical History:   Diagnosis Date    Asthma     Attention deficit hyperactivity disorder     GERD (gastroesophageal reflux disease)     Hypertension     intercranial hypertension     Pseudotumor cerebri     Psychiatric problem     Anxiety       Past Surgical History:   Procedure Laterality Date    CHOLECYSTECTOMY      2018       Social History     Tobacco Use   Smoking Status Never Smoker   Smokeless Tobacco Never Used        Social History     Substance and Sexual Activity   Alcohol Use No       No results found for this visit on 19. Vitals:  /84   Wt (!) 341 lb (154.7 kg)   LMP 2018 (Approximate)   BMI 46.25 kg/m²   Estimated body mass index is 46.25 kg/m² as calculated from the following:    Height as of 17: 6' (1.829 m). Weight as of this encounter: 341 lb (154.7 kg). HPI: here for routine ob visit, some ongoing nausea     PT denies fever, chills, nausea and vomiting       Abdomen: enlarged, gravid, soft, nontender     Results reviewed today:    No results found for this visit on 19. See prenatal vital sign section and fetal assessment section    ASSESSMENT & Plan    Diagnosis Orders   1. Encounter for supervision of normal first pregnancy in third trimester     2. 38 weeks gestation of pregnancy               I am having Sherly Turpin maintain her albuterol sulfate HFA, Prenatal MV-Min-Fe Fum-FA-DHA (PRENATAL 1 PO), ondansetron, vitamin B-6, calcium carbonate, pantoprazole, ferrous sulfate, and ferrous sulfate.     Return in about 1 week (around 6/19/2019) for ob, ob zane for efw. There are no Patient Instructions on file for this visit.           Gracie Gould,6/12/2019 12:06 PM

## 2019-06-19 ENCOUNTER — ROUTINE PRENATAL (OUTPATIENT)
Dept: OBGYN | Age: 34
End: 2019-06-19
Payer: COMMERCIAL

## 2019-06-19 VITALS — BODY MASS INDEX: 46.79 KG/M2 | WEIGHT: 293 LBS | DIASTOLIC BLOOD PRESSURE: 80 MMHG | SYSTOLIC BLOOD PRESSURE: 130 MMHG

## 2019-06-19 DIAGNOSIS — Z3A.39 39 WEEKS GESTATION OF PREGNANCY: ICD-10-CM

## 2019-06-19 DIAGNOSIS — E66.01 MATERNAL MORBID OBESITY, ANTEPARTUM, THIRD TRIMESTER (HCC): Primary | ICD-10-CM

## 2019-06-19 DIAGNOSIS — O99.013 ANEMIA AFFECTING PREGNANCY IN THIRD TRIMESTER: ICD-10-CM

## 2019-06-19 DIAGNOSIS — O99.213 MATERNAL MORBID OBESITY, ANTEPARTUM, THIRD TRIMESTER (HCC): Primary | ICD-10-CM

## 2019-06-19 LAB
ABDOMINAL CIRCUMFERENCE: 38.8 CM
BIPARIETAL DIAMETER: 9.9 CM
ESTIMATED FETAL WEIGHT: 4494 GRAMS
FEMORAL DIAMETER: NORMAL CM
HC/AC: 0.92
HEAD CIRCUMFERENCE: 35.6 CM

## 2019-06-19 PROCEDURE — 76815 OB US LIMITED FETUS(S): CPT | Performed by: OBSTETRICS & GYNECOLOGY

## 2019-06-19 PROCEDURE — 0502F SUBSEQUENT PRENATAL CARE: CPT | Performed by: ADVANCED PRACTICE MIDWIFE

## 2019-06-23 ENCOUNTER — HOSPITAL ENCOUNTER (OUTPATIENT)
Age: 34
Setting detail: SURGERY ADMIT
Discharge: HOME | End: 2019-06-23
Attending: OBSTETRICS & GYNECOLOGY | Admitting: OBSTETRICS & GYNECOLOGY
Payer: COMMERCIAL

## 2019-06-23 LAB
ABO/RH: NORMAL
ABSOLUTE EOS #: 0.03 K/UL (ref 0–0.44)
ABSOLUTE IMMATURE GRANULOCYTE: 0.06 K/UL (ref 0–0.3)
ABSOLUTE LYMPH #: 1.82 K/UL (ref 1.1–3.7)
ABSOLUTE MONO #: 0.9 K/UL (ref 0.1–1.2)
ANTIBODY SCREEN: NEGATIVE
ARM BAND NUMBER: NORMAL
BASOPHILS # BLD: 0 % (ref 0–2)
BASOPHILS ABSOLUTE: <0.03 K/UL (ref 0–0.2)
DIFFERENTIAL TYPE: ABNORMAL
EOSINOPHILS RELATIVE PERCENT: 0 % (ref 1–4)
EXPIRATION DATE: NORMAL
HCT VFR BLD CALC: 34.7 % (ref 36.3–47.1)
HEMOGLOBIN: 10.8 G/DL (ref 11.9–15.1)
IMMATURE GRANULOCYTES: 1 %
LYMPHOCYTES # BLD: 15 % (ref 24–43)
MCH RBC QN AUTO: 26.4 PG (ref 25.2–33.5)
MCHC RBC AUTO-ENTMCNC: 31.1 G/DL (ref 28.4–34.8)
MCV RBC AUTO: 84.8 FL (ref 82.6–102.9)
MONOCYTES # BLD: 8 % (ref 3–12)
NRBC AUTOMATED: 0 PER 100 WBC
PDW BLD-RTO: 13.7 % (ref 11.8–14.4)
PLATELET # BLD: 226 K/UL (ref 138–453)
PLATELET ESTIMATE: ABNORMAL
PMV BLD AUTO: 10.7 FL (ref 8.1–13.5)
RBC # BLD: 4.09 M/UL (ref 3.95–5.11)
RBC # BLD: ABNORMAL 10*6/UL
SEG NEUTROPHILS: 76 % (ref 36–65)
SEGMENTED NEUTROPHILS ABSOLUTE COUNT: 9.01 K/UL (ref 1.5–8.1)
WBC # BLD: 11.8 K/UL (ref 3.5–11.3)
WBC # BLD: ABNORMAL 10*3/UL

## 2019-06-23 PROCEDURE — 85025 COMPLETE CBC W/AUTO DIFF WBC: CPT

## 2019-06-23 PROCEDURE — 36415 COLL VENOUS BLD VENIPUNCTURE: CPT

## 2019-06-23 PROCEDURE — 86850 RBC ANTIBODY SCREEN: CPT

## 2019-06-23 PROCEDURE — 86900 BLOOD TYPING SEROLOGIC ABO: CPT

## 2019-06-23 PROCEDURE — 86901 BLOOD TYPING SEROLOGIC RH(D): CPT

## 2019-06-24 ENCOUNTER — HOSPITAL ENCOUNTER (INPATIENT)
Age: 34
LOS: 3 days | Discharge: HOME | End: 2019-06-27
Attending: OBSTETRICS & GYNECOLOGY | Admitting: OBSTETRICS & GYNECOLOGY
Payer: COMMERCIAL

## 2019-06-24 ENCOUNTER — ANESTHESIA EVENT (OUTPATIENT)
Dept: LABOR AND DELIVERY | Age: 34
End: 2019-06-24
Payer: COMMERCIAL

## 2019-06-24 ENCOUNTER — ANESTHESIA (OUTPATIENT)
Dept: LABOR AND DELIVERY | Age: 34
End: 2019-06-24
Payer: COMMERCIAL

## 2019-06-24 VITALS
SYSTOLIC BLOOD PRESSURE: 130 MMHG | DIASTOLIC BLOOD PRESSURE: 73 MMHG | TEMPERATURE: 98.6 F | RESPIRATION RATE: 12 BRPM | OXYGEN SATURATION: 96 %

## 2019-06-24 PROBLEM — Z34.90 TERM PREGNANCY: Status: ACTIVE | Noted: 2019-06-24

## 2019-06-24 LAB
AMPHETAMINE SCREEN URINE: NEGATIVE
BARBITURATE SCREEN URINE: NEGATIVE
BENZODIAZEPINE SCREEN, URINE: NEGATIVE
BUPRENORPHINE URINE: NEGATIVE
CANNABINOID SCREEN URINE: NEGATIVE
COCAINE METABOLITE, URINE: NEGATIVE
MDMA URINE: NORMAL
METHADONE SCREEN, URINE: NEGATIVE
METHAMPHETAMINE, URINE: NEGATIVE
OPIATES, URINE: NEGATIVE
OXYCODONE SCREEN URINE: NEGATIVE
PHENCYCLIDINE, URINE: NEGATIVE
PROPOXYPHENE, URINE: NEGATIVE
TEST INFORMATION: NORMAL
TRICYCLIC ANTIDEPRESSANTS, UR: NEGATIVE

## 2019-06-24 PROCEDURE — 6360000002 HC RX W HCPCS: Performed by: OBSTETRICS & GYNECOLOGY

## 2019-06-24 PROCEDURE — 1220000000 HC SEMI PRIVATE OB R&B

## 2019-06-24 PROCEDURE — 2500000003 HC RX 250 WO HCPCS: Performed by: ADVANCED PRACTICE MIDWIFE

## 2019-06-24 PROCEDURE — 2580000003 HC RX 258: Performed by: OBSTETRICS & GYNECOLOGY

## 2019-06-24 PROCEDURE — 2709999900 HC NON-CHARGEABLE SUPPLY: Performed by: OBSTETRICS & GYNECOLOGY

## 2019-06-24 PROCEDURE — 2580000003 HC RX 258: Performed by: ADVANCED PRACTICE MIDWIFE

## 2019-06-24 PROCEDURE — 7100000001 HC PACU RECOVERY - ADDTL 15 MIN: Performed by: OBSTETRICS & GYNECOLOGY

## 2019-06-24 PROCEDURE — 7100000000 HC PACU RECOVERY - FIRST 15 MIN: Performed by: OBSTETRICS & GYNECOLOGY

## 2019-06-24 PROCEDURE — 64488 TAP BLOCK BI INJECTION: CPT | Performed by: NURSE ANESTHETIST, CERTIFIED REGISTERED

## 2019-06-24 PROCEDURE — 6370000000 HC RX 637 (ALT 250 FOR IP): Performed by: ADVANCED PRACTICE MIDWIFE

## 2019-06-24 PROCEDURE — 6360000002 HC RX W HCPCS: Performed by: ADVANCED PRACTICE MIDWIFE

## 2019-06-24 PROCEDURE — 3700000001 HC ADD 15 MINUTES (ANESTHESIA): Performed by: OBSTETRICS & GYNECOLOGY

## 2019-06-24 PROCEDURE — 6360000002 HC RX W HCPCS: Performed by: NURSE ANESTHETIST, CERTIFIED REGISTERED

## 2019-06-24 PROCEDURE — 3700000000 HC ANESTHESIA ATTENDED CARE: Performed by: OBSTETRICS & GYNECOLOGY

## 2019-06-24 PROCEDURE — 99024 POSTOP FOLLOW-UP VISIT: CPT | Performed by: ADVANCED PRACTICE MIDWIFE

## 2019-06-24 PROCEDURE — 3609079900 HC CESAREAN SECTION: Performed by: OBSTETRICS & GYNECOLOGY

## 2019-06-24 PROCEDURE — 59510 CESAREAN DELIVERY: CPT | Performed by: OBSTETRICS & GYNECOLOGY

## 2019-06-24 PROCEDURE — 59514 CESAREAN DELIVERY ONLY: CPT | Performed by: ADVANCED PRACTICE MIDWIFE

## 2019-06-24 PROCEDURE — 80306 DRUG TEST PRSMV INSTRMNT: CPT

## 2019-06-24 RX ORDER — SODIUM CHLORIDE, SODIUM LACTATE, POTASSIUM CHLORIDE, CALCIUM CHLORIDE 600; 310; 30; 20 MG/100ML; MG/100ML; MG/100ML; MG/100ML
INJECTION, SOLUTION INTRAVENOUS CONTINUOUS
Status: DISCONTINUED | OUTPATIENT
Start: 2019-06-24 | End: 2019-06-27 | Stop reason: HOSPADM

## 2019-06-24 RX ORDER — DEXAMETHASONE SODIUM PHOSPHATE 4 MG/ML
INJECTION, SOLUTION INTRA-ARTICULAR; INTRALESIONAL; INTRAMUSCULAR; INTRAVENOUS; SOFT TISSUE PRN
Status: DISCONTINUED | OUTPATIENT
Start: 2019-06-24 | End: 2019-06-24 | Stop reason: SDUPTHER

## 2019-06-24 RX ORDER — DOCUSATE SODIUM 100 MG/1
100 CAPSULE, LIQUID FILLED ORAL 2 TIMES DAILY
Status: DISCONTINUED | OUTPATIENT
Start: 2019-06-24 | End: 2019-06-27 | Stop reason: HOSPADM

## 2019-06-24 RX ORDER — ACETAMINOPHEN 10 MG/ML
INJECTION, SOLUTION INTRAVENOUS PRN
Status: DISCONTINUED | OUTPATIENT
Start: 2019-06-24 | End: 2019-06-24 | Stop reason: SDUPTHER

## 2019-06-24 RX ORDER — SODIUM CHLORIDE 0.9 % (FLUSH) 0.9 %
10 SYRINGE (ML) INJECTION EVERY 12 HOURS SCHEDULED
Status: DISCONTINUED | OUTPATIENT
Start: 2019-06-24 | End: 2019-06-27 | Stop reason: HOSPADM

## 2019-06-24 RX ORDER — ONDANSETRON 2 MG/ML
INJECTION INTRAMUSCULAR; INTRAVENOUS PRN
Status: DISCONTINUED | OUTPATIENT
Start: 2019-06-24 | End: 2019-06-24 | Stop reason: SDUPTHER

## 2019-06-24 RX ORDER — LANOLIN 100 %
OINTMENT (GRAM) TOPICAL
Status: DISCONTINUED | OUTPATIENT
Start: 2019-06-24 | End: 2019-06-27 | Stop reason: HOSPADM

## 2019-06-24 RX ORDER — METOCLOPRAMIDE HYDROCHLORIDE 5 MG/ML
10 INJECTION INTRAMUSCULAR; INTRAVENOUS ONCE
Status: COMPLETED | OUTPATIENT
Start: 2019-06-24 | End: 2019-06-24

## 2019-06-24 RX ORDER — OXYCODONE HYDROCHLORIDE AND ACETAMINOPHEN 5; 325 MG/1; MG/1
1 TABLET ORAL EVERY 4 HOURS PRN
Status: DISCONTINUED | OUTPATIENT
Start: 2019-06-24 | End: 2019-06-27 | Stop reason: HOSPADM

## 2019-06-24 RX ORDER — SODIUM CHLORIDE 0.9 % (FLUSH) 0.9 %
10 SYRINGE (ML) INJECTION PRN
Status: DISCONTINUED | OUTPATIENT
Start: 2019-06-24 | End: 2019-06-27 | Stop reason: HOSPADM

## 2019-06-24 RX ORDER — ROPIVACAINE HYDROCHLORIDE 5 MG/ML
INJECTION, SOLUTION EPIDURAL; INFILTRATION; PERINEURAL PRN
Status: DISCONTINUED | OUTPATIENT
Start: 2019-06-24 | End: 2019-06-24 | Stop reason: SDUPTHER

## 2019-06-24 RX ORDER — TRISODIUM CITRATE DIHYDRATE AND CITRIC ACID MONOHYDRATE 500; 334 MG/5ML; MG/5ML
30 SOLUTION ORAL ONCE
Status: COMPLETED | OUTPATIENT
Start: 2019-06-24 | End: 2019-06-24

## 2019-06-24 RX ORDER — ACETAMINOPHEN 325 MG/1
650 TABLET ORAL EVERY 4 HOURS PRN
Status: DISCONTINUED | OUTPATIENT
Start: 2019-06-24 | End: 2019-06-27 | Stop reason: HOSPADM

## 2019-06-24 RX ORDER — SODIUM CHLORIDE, SODIUM LACTATE, POTASSIUM CHLORIDE, CALCIUM CHLORIDE 600; 310; 30; 20 MG/100ML; MG/100ML; MG/100ML; MG/100ML
1000 INJECTION, SOLUTION INTRAVENOUS ONCE
Status: DISCONTINUED | OUTPATIENT
Start: 2019-06-24 | End: 2019-06-27 | Stop reason: HOSPADM

## 2019-06-24 RX ORDER — KETOROLAC TROMETHAMINE 30 MG/ML
INJECTION, SOLUTION INTRAMUSCULAR; INTRAVENOUS PRN
Status: DISCONTINUED | OUTPATIENT
Start: 2019-06-24 | End: 2019-06-24 | Stop reason: SDUPTHER

## 2019-06-24 RX ORDER — DEXAMETHASONE SODIUM PHOSPHATE 10 MG/ML
INJECTION, SOLUTION INTRAMUSCULAR; INTRAVENOUS PRN
Status: DISCONTINUED | OUTPATIENT
Start: 2019-06-24 | End: 2019-06-24 | Stop reason: SDUPTHER

## 2019-06-24 RX ORDER — KETOROLAC TROMETHAMINE 30 MG/ML
30 INJECTION, SOLUTION INTRAMUSCULAR; INTRAVENOUS EVERY 6 HOURS
Status: COMPLETED | OUTPATIENT
Start: 2019-06-24 | End: 2019-06-25

## 2019-06-24 RX ADMIN — KETOROLAC TROMETHAMINE 30 MG: 30 INJECTION, SOLUTION INTRAMUSCULAR at 20:43

## 2019-06-24 RX ADMIN — SODIUM CHLORIDE, POTASSIUM CHLORIDE, SODIUM LACTATE AND CALCIUM CHLORIDE: 600; 310; 30; 20 INJECTION, SOLUTION INTRAVENOUS at 07:59

## 2019-06-24 RX ADMIN — OXYTOCIN-SODIUM CHLORIDE 0.9% IV SOLN 30 UNIT/500ML 166 MILLI-UNITS/MIN: 30-0.9/5 SOLUTION at 08:15

## 2019-06-24 RX ADMIN — METOCLOPRAMIDE 10 MG: 5 INJECTION, SOLUTION INTRAMUSCULAR; INTRAVENOUS at 06:56

## 2019-06-24 RX ADMIN — OXYTOCIN-SODIUM CHLORIDE 0.9% IV SOLN 30 UNIT/500ML 150 ML: 30-0.9/5 SOLUTION at 08:07

## 2019-06-24 RX ADMIN — PHENYLEPHRINE HYDROCHLORIDE 100 MCG: 10 INJECTION INTRAVENOUS at 07:52

## 2019-06-24 RX ADMIN — DEXAMETHASONE SODIUM PHOSPHATE 8 MG: 4 INJECTION, SOLUTION INTRAMUSCULAR; INTRAVENOUS at 08:07

## 2019-06-24 RX ADMIN — DEXAMETHASONE SODIUM PHOSPHATE 10 MG: 10 INJECTION, SOLUTION INTRAMUSCULAR; INTRAVENOUS at 08:54

## 2019-06-24 RX ADMIN — SODIUM CHLORIDE, POTASSIUM CHLORIDE, SODIUM LACTATE AND CALCIUM CHLORIDE: 600; 310; 30; 20 INJECTION, SOLUTION INTRAVENOUS at 09:45

## 2019-06-24 RX ADMIN — KETOROLAC TROMETHAMINE 30 MG: 30 INJECTION, SOLUTION INTRAMUSCULAR; INTRAVENOUS at 08:15

## 2019-06-24 RX ADMIN — ACETAMINOPHEN 1000 MG: 10 INJECTION, SOLUTION INTRAVENOUS at 08:15

## 2019-06-24 RX ADMIN — ROPIVACAINE HYDROCHLORIDE 60 ML: 5 INJECTION, SOLUTION EPIDURAL; INFILTRATION; PERINEURAL at 08:54

## 2019-06-24 RX ADMIN — ONDANSETRON 4 MG: 2 INJECTION INTRAMUSCULAR; INTRAVENOUS at 08:07

## 2019-06-24 RX ADMIN — PHENYLEPHRINE HYDROCHLORIDE 200 MCG: 10 INJECTION INTRAVENOUS at 07:49

## 2019-06-24 RX ADMIN — CEFOXITIN SODIUM 2 G: 2 POWDER, FOR SOLUTION INTRAVENOUS at 06:55

## 2019-06-24 RX ADMIN — FAMOTIDINE 20 MG: 10 INJECTION, SOLUTION INTRAVENOUS at 06:56

## 2019-06-24 RX ADMIN — KETOROLAC TROMETHAMINE 30 MG: 30 INJECTION, SOLUTION INTRAMUSCULAR at 13:52

## 2019-06-24 RX ADMIN — SODIUM CHLORIDE, POTASSIUM CHLORIDE, SODIUM LACTATE AND CALCIUM CHLORIDE: 600; 310; 30; 20 INJECTION, SOLUTION INTRAVENOUS at 05:49

## 2019-06-24 RX ADMIN — PHENYLEPHRINE HYDROCHLORIDE 100 MCG: 10 INJECTION INTRAVENOUS at 07:58

## 2019-06-24 RX ADMIN — SODIUM CITRATE AND CITRIC ACID MONOHYDRATE 30 ML: 500; 334 SOLUTION ORAL at 06:56

## 2019-06-24 ASSESSMENT — PULMONARY FUNCTION TESTS
PIF_VALUE: 0
PIF_VALUE: 1
PIF_VALUE: 0
PIF_VALUE: 1
PIF_VALUE: 0
PIF_VALUE: 0
PIF_VALUE: 1
PIF_VALUE: 0
PIF_VALUE: 1
PIF_VALUE: 0
PIF_VALUE: 1
PIF_VALUE: 0
PIF_VALUE: 1
PIF_VALUE: 0
PIF_VALUE: 1
PIF_VALUE: 0
PIF_VALUE: 1
PIF_VALUE: 0
PIF_VALUE: 1
PIF_VALUE: 0
PIF_VALUE: 1
PIF_VALUE: 0
PIF_VALUE: 1
PIF_VALUE: 0
PIF_VALUE: 1
PIF_VALUE: 0
PIF_VALUE: 1
PIF_VALUE: 0
PIF_VALUE: 1
PIF_VALUE: 0

## 2019-06-24 ASSESSMENT — PAIN SCALES - GENERAL
PAINLEVEL_OUTOF10: 2
PAINLEVEL_OUTOF10: 3

## 2019-06-24 NOTE — FLOWSHEET NOTE
Pt. Arrived to unit ambulatory for scheduled , Educated on POC, pt. In restroom using wipes at this time.

## 2019-06-24 NOTE — ANESTHESIA PROCEDURE NOTES
Spinal Block    Patient location during procedure: OB  Start time: 6/24/2019 7:35 AM  End time: 6/24/2019 7:42 AM  Reason for block: primary anesthetic and at surgeon's request  Staffing  Resident/CRNA: ROSAURA Purdy CRNA  Preanesthetic Checklist  Completed: patient identified, site marked, surgical consent, pre-op evaluation, timeout performed, IV checked, risks and benefits discussed, monitors and equipment checked, anesthesia consent given, oxygen available and patient being monitored  Spinal Block  Patient position: sitting  Prep: ChloraPrep  Patient monitoring: cardiac monitor, continuous pulse ox and frequent blood pressure checks  Approach: midline  Location: L3/L4  Provider prep: mask and sterile gloves  Local infiltration: lidocaine  Agent: bupivacaine  Dose: 1.8  Dose: 1.8  Needle  Needle type: Pencan   Needle gauge: 25 G  Needle length: 6 in  Lot number: [id number]  Expiration date: 7/1/2020  Assessment  Sensory level: T4  Swirl obtained: Yes  CSF: clear  Attempts: 2  Hemodynamics: stable

## 2019-06-24 NOTE — H&P
Sexual activity: Yes     Partners: Male   Lifestyle    Physical activity:     Days per week: Not on file     Minutes per session: Not on file    Stress: Not on file   Relationships    Social connections:     Talks on phone: Not on file     Gets together: Not on file     Attends [employer] service: Not on file     Active member of club or organization: Not on file     Attends meetings of clubs or organizations: Not on file     Relationship status: Not on file    Intimate partner violence:     Fear of current or ex partner: Not on file     Emotionally abused: Not on file     Physically abused: Not on file     Forced sexual activity: Not on file   Other Topics Concern    Not on file   Social History Narrative    Not on file     Family History:       Problem Relation Age of Onset    Lung Cancer Paternal Grandfather         black lung    No Known Problems Paternal Grandmother     Alzheimer's Disease Maternal Grandmother     Diabetes Maternal Grandmother     Heart Disease Maternal Grandfather     Alzheimer's Disease Maternal Grandfather     Heart Disease Father     Depression Mother     Thyroid Disease Mother     No Known Problems Brother     No Known Problems Sister     Anxiety Disorder Brother      Medications Prior to Admission:  Medications Prior to Admission: pantoprazole (PROTONIX) 40 MG tablet, Take 40 mg by mouth daily  calcium carbonate (TUMS) 500 MG chewable tablet, Take 1 tablet by mouth daily  Prenatal MV-Min-Fe Fum-FA-DHA (PRENATAL 1 PO), Take by mouth daily  ferrous sulfate 325 (65 Fe) MG tablet, Take 1 tablet by mouth 2 times daily  ferrous sulfate 325 (65 Fe) MG tablet, Take 1 tablet by mouth 2 times daily  vitamin B-6 (PYRIDOXINE) 50 MG tablet, Take 50 mg by mouth daily  ondansetron (ZOFRAN) 4 MG tablet, Take 1 tablet by mouth every 8 hours as needed for Nausea or Vomiting  albuterol sulfate  (90 BASE) MCG/ACT inhaler, Inhale 2 puffs into the lungs every 6 hours as needed for

## 2019-06-24 NOTE — PROGRESS NOTES
Lactation education resources given:       [x]  How to Breastfeed your baby - [address] publication      [x]  Information on feeding cues     [x]  Support group handout    [x]  Breastpump cleaning guidelines - [facility]     [x]  Breastfeeding & Safe Sleep handout - [address] publication    [x]  Calling All Dads! Handout - [address] publication    []  Breast and Nipple Care - [place]     []  [address]    []  [place]    []  Going Back to Work - [place]    []  Preventing Engorgement - [place]    Supplies given:    []  Brush, soap and basin for breastpump cleaning    []  Insurance pump provided through [employer]    []  Insurance pump provided through [employer] to patient, patient verbalizes understanding.         Signed:  Tenzin Hyatt RN, BSN, IBCLC

## 2019-06-24 NOTE — ANESTHESIA PRE PROCEDURE
(MEFOXIN) 2 g in dextrose 5% 50 mL (mini-bag)  2 g Intravenous On Call to [address], [facility] 100 mL/hr at 06/24/19 0655 2 g at 06/24/19 0655    oxytocin (PITOCIN) 30 units in 500 mL infusion  1 lesia-units/min Intravenous Continuous Shonda Divers, APRN - CN           Allergies: Allergies   Allergen Reactions    Aspartame And Phenylalanine      Migraine       Problem List:    Patient Active Problem List   Diagnosis Code    Frequent headaches R51    Pseudotumor cerebri G93.2    Maternal morbid obesity, antepartum, third trimester ([facility] [address] O99.213, E66.01    Term pregnancy Z34.80    Macrosomia affecting management of mother in third trimester O40.64X0       Past Medical History:        Diagnosis Date    Asthma     Attention deficit hyperactivity disorder     GERD (gastroesophageal reflux disease)     Hypertension     intercranial hypertension     Pseudotumor cerebri     Psychiatric problem     Anxiety       Past Surgical History:        Procedure Laterality Date    CHOLECYSTECTOMY      06/07/2018    WISDOM TOOTH EXTRACTION  2014       Social History:    Social History     Tobacco Use    Smoking status: Never Smoker    Smokeless tobacco: Never Used   Substance Use Topics    Alcohol use:  No                                Counseling given: Not Answered      Vital Signs (Current):   Vitals:    06/24/19 0523 06/24/19 0529 06/24/19 0538   BP:  117/81    Pulse:  100    Resp: 20     Temp: 36.6 °C (97.9 °F)     TempSrc: Temporal     Weight:   (!) 345 lb (156.5 kg)   Height:   6' (1.829 m)                                              BP Readings from Last 3 Encounters:   06/24/19 117/81   06/19/19 130/80   06/12/19 124/84       NPO Status: Time of last liquid consumption: 2000                        Time of last solid consumption: 1900                        Date of last liquid consumption: 06/23/19                        Date of last solid food consumption: 06/23/19    BMI:   Wt Readings from Last 3 Encounters:   06/24/19 (!) 345 lb (156.5 kg)   06/19/19 (!) 345 lb (156.5 kg)   06/12/19 (!) 341 lb (154.7 kg)     Body mass index is 46.79 kg/m². CBC:   Lab Results   Component Value Date    WBC 11.8 06/23/2019    RBC 4.09 06/23/2019    HGB 10.8 06/23/2019    HCT 34.7 06/23/2019    MCV 84.8 06/23/2019    RDW 13.7 06/23/2019     06/23/2019       CMP:   Lab Results   Component Value Date    GLUCOSE 128 04/04/2019       POC Tests: No results for input(s): POCGLU, POCNA, POCK, POCCL, POCBUN, POCHEMO, POCHCT in the last 72 hours. Coags: No results found for: PROTIME, INR, APTT    HCG (If Applicable):   Lab Results   Component Value Date    PREGTESTUR Positive 10/22/2018        ABGs: No results found for: PHART, PO2ART, PKE7BVQ, BBK2WKY, BEART, D3XDQYRT     Type & Screen (If Applicable):  No results found for: LABABO, [address]    Anesthesia Evaluation  Patient summary reviewed and Nursing notes reviewed   history of anesthetic complications: PONV. Airway: Mallampati: II  TM distance: <3 FB   Neck ROM: full  Mouth opening: > = 3 FB Dental: normal exam         Pulmonary: breath sounds clear to auscultation  (+) asthma: exercise-induced asthma,                            Cardiovascular:    (+) hypertension:,         Rhythm: regular  Rate: normal                    Neuro/Psych:   (+) headaches:, psychiatric history:             ROS comment: Pseudotumor cerebri GI/Hepatic/Renal:   (+) GERD:, morbid obesity          Endo/Other:                     Abdominal:   (+) obese,         Vascular:                                      Anesthesia Plan      spinal     ASA 3     (Plan discussed with patient. Risks and benefits explained. All questions answered. Pt is agreeable to spinal anesthesia with GA as backup and TAP block for post op pain. )        Anesthetic plan and risks discussed with patient. Use of blood products discussed with patient whom consented to blood products.                    Sree Orantes Bari Alvarez, APRN - CRNA   6/24/2019

## 2019-06-24 NOTE — ANESTHESIA PROCEDURE NOTES
Peripheral Block    Patient location during procedure: OB  Start time: 6/24/2019 8:47 AM  End time: 6/24/2019 8:54 AM  Staffing  Resident/CRNA: ROSAURA Frazier CRNA  Preanesthetic Checklist  Completed: patient identified, site marked, surgical consent, pre-op evaluation, timeout performed, IV checked, risks and benefits discussed, monitors and equipment checked, anesthesia consent given, oxygen available and patient being monitored  Peripheral Block  Patient position: supine  Prep: ChloraPrep  Patient monitoring: continuous pulse ox, IV access, cardiac monitor and frequent blood pressure checks  Block type: TAP  Laterality: bilateral  Injection technique: single-shot  Procedures: ultrasound guided  Local infiltration: ropivacaine  Infiltration strength: 0.5 %  Dose: 60 mL  Provider prep: sterile gloves and mask  Local infiltration: ropivacaine  Needle  Needle type: pajunk.    Needle gauge: 25 G  Needle localization: anatomical landmarks and ultrasound guidance  Catheter type: open end  Assessment  Injection assessment: negative aspiration for heme, no paresthesia on injection and local visualized surrounding nerve on ultrasound  Paresthesia pain: none  Slow fractionated injection: yes  Hemodynamics: stable  Reason for block: post-op pain management and at surgeon's request

## 2019-06-25 LAB
HCT VFR BLD CALC: 29.2 % (ref 36.3–47.1)
HEMOGLOBIN: 9 G/DL (ref 11.9–15.1)
MCH RBC QN AUTO: 25.9 PG (ref 25.2–33.5)
MCHC RBC AUTO-ENTMCNC: 30.8 G/DL (ref 28.4–34.8)
MCV RBC AUTO: 83.9 FL (ref 82.6–102.9)
NRBC AUTOMATED: 0 PER 100 WBC
PDW BLD-RTO: 13.8 % (ref 11.8–14.4)
PLATELET # BLD: 219 K/UL (ref 138–453)
PMV BLD AUTO: 10.8 FL (ref 8.1–13.5)
RBC # BLD: 3.48 M/UL (ref 3.95–5.11)
WBC # BLD: 15.7 K/UL (ref 3.5–11.3)

## 2019-06-25 PROCEDURE — 2580000003 HC RX 258: Performed by: ADVANCED PRACTICE MIDWIFE

## 2019-06-25 PROCEDURE — 36415 COLL VENOUS BLD VENIPUNCTURE: CPT

## 2019-06-25 PROCEDURE — 6360000002 HC RX W HCPCS: Performed by: OBSTETRICS & GYNECOLOGY

## 2019-06-25 PROCEDURE — 6370000000 HC RX 637 (ALT 250 FOR IP): Performed by: OBSTETRICS & GYNECOLOGY

## 2019-06-25 PROCEDURE — 1220000000 HC SEMI PRIVATE OB R&B

## 2019-06-25 PROCEDURE — 99024 POSTOP FOLLOW-UP VISIT: CPT | Performed by: ADVANCED PRACTICE MIDWIFE

## 2019-06-25 PROCEDURE — 85027 COMPLETE CBC AUTOMATED: CPT

## 2019-06-25 PROCEDURE — 2580000003 HC RX 258: Performed by: OBSTETRICS & GYNECOLOGY

## 2019-06-25 RX ORDER — IBUPROFEN 800 MG/1
800 TABLET ORAL EVERY 8 HOURS
Status: DISCONTINUED | OUTPATIENT
Start: 2019-06-26 | End: 2019-06-27 | Stop reason: HOSPADM

## 2019-06-25 RX ORDER — IBUPROFEN 800 MG/1
800 TABLET ORAL EVERY 6 HOURS PRN
Status: DISCONTINUED | OUTPATIENT
Start: 2019-06-25 | End: 2019-06-25

## 2019-06-25 RX ORDER — ZOLPIDEM TARTRATE 5 MG/1
5 TABLET ORAL
Status: ACTIVE | OUTPATIENT
Start: 2019-06-25 | End: 2019-06-25

## 2019-06-25 RX ADMIN — KETOROLAC TROMETHAMINE 30 MG: 30 INJECTION, SOLUTION INTRAMUSCULAR at 09:29

## 2019-06-25 RX ADMIN — ACETAMINOPHEN 650 MG: 325 TABLET, FILM COATED ORAL at 17:26

## 2019-06-25 RX ADMIN — KETOROLAC TROMETHAMINE 30 MG: 30 INJECTION, SOLUTION INTRAMUSCULAR at 02:08

## 2019-06-25 RX ADMIN — ACETAMINOPHEN 650 MG: 325 TABLET, FILM COATED ORAL at 12:55

## 2019-06-25 RX ADMIN — Medication 10 ML: at 09:29

## 2019-06-25 RX ADMIN — IBUPROFEN 800 MG: 800 TABLET, FILM COATED ORAL at 16:14

## 2019-06-25 RX ADMIN — ACETAMINOPHEN 650 MG: 325 TABLET, FILM COATED ORAL at 00:01

## 2019-06-25 RX ADMIN — DOCUSATE SODIUM 100 MG: 100 CAPSULE, LIQUID FILLED ORAL at 00:00

## 2019-06-25 RX ADMIN — ENOXAPARIN SODIUM 80 MG: 80 INJECTION SUBCUTANEOUS at 09:28

## 2019-06-25 RX ADMIN — Medication 10 ML: at 02:09

## 2019-06-25 RX ADMIN — Medication: at 09:35

## 2019-06-25 RX ADMIN — DOCUSATE SODIUM 100 MG: 100 CAPSULE, LIQUID FILLED ORAL at 09:29

## 2019-06-25 ASSESSMENT — PAIN SCALES - GENERAL
PAINLEVEL_OUTOF10: 4
PAINLEVEL_OUTOF10: 3
PAINLEVEL_OUTOF10: 0
PAINLEVEL_OUTOF10: 4
PAINLEVEL_OUTOF10: 3
PAINLEVEL_OUTOF10: 4

## 2019-06-25 NOTE — FLOWSHEET NOTE
Patient requesting to shower. IV covered and shower supplies provided. Pt denies feeling dizzy/sob.  at bedside. Instructed pt to call out if she starts feeling dizzy/SOB and pt verbalizes understanding. Incision education went over with patient.

## 2019-06-25 NOTE — PROGRESS NOTES
[employer] pump set up for pt with explanation. Advised pt to set phone alarm for every 3 hours so she remembers to pump to keep her supply established. Pt verbalizes understanding.

## 2019-06-26 PROCEDURE — 6370000000 HC RX 637 (ALT 250 FOR IP): Performed by: OBSTETRICS & GYNECOLOGY

## 2019-06-26 PROCEDURE — 90471 IMMUNIZATION ADMIN: CPT | Performed by: OBSTETRICS & GYNECOLOGY

## 2019-06-26 PROCEDURE — 90715 TDAP VACCINE 7 YRS/> IM: CPT | Performed by: OBSTETRICS & GYNECOLOGY

## 2019-06-26 PROCEDURE — 6360000002 HC RX W HCPCS: Performed by: OBSTETRICS & GYNECOLOGY

## 2019-06-26 PROCEDURE — 1220000000 HC SEMI PRIVATE OB R&B

## 2019-06-26 PROCEDURE — 99024 POSTOP FOLLOW-UP VISIT: CPT | Performed by: ADVANCED PRACTICE MIDWIFE

## 2019-06-26 RX ADMIN — ACETAMINOPHEN 650 MG: 325 TABLET, FILM COATED ORAL at 20:47

## 2019-06-26 RX ADMIN — IBUPROFEN 800 MG: 800 TABLET, FILM COATED ORAL at 08:50

## 2019-06-26 RX ADMIN — TETANUS TOXOID, REDUCED DIPHTHERIA TOXOID AND ACELLULAR PERTUSSIS VACCINE, ADSORBED 0.5 ML: 5; 2.5; 8; 8; 2.5 SUSPENSION INTRAMUSCULAR at 17:34

## 2019-06-26 RX ADMIN — DOCUSATE SODIUM 100 MG: 100 CAPSULE, LIQUID FILLED ORAL at 20:47

## 2019-06-26 RX ADMIN — IBUPROFEN 800 MG: 800 TABLET, FILM COATED ORAL at 00:29

## 2019-06-26 RX ADMIN — DOCUSATE SODIUM 100 MG: 100 CAPSULE, LIQUID FILLED ORAL at 00:28

## 2019-06-26 RX ADMIN — IBUPROFEN 800 MG: 800 TABLET, FILM COATED ORAL at 17:04

## 2019-06-26 RX ADMIN — ENOXAPARIN SODIUM 80 MG: 80 INJECTION SUBCUTANEOUS at 10:03

## 2019-06-26 RX ADMIN — DOCUSATE SODIUM 100 MG: 100 CAPSULE, LIQUID FILLED ORAL at 08:50

## 2019-06-26 RX ADMIN — ACETAMINOPHEN 650 MG: 325 TABLET, FILM COATED ORAL at 06:26

## 2019-06-26 ASSESSMENT — PAIN SCALES - GENERAL
PAINLEVEL_OUTOF10: 3
PAINLEVEL_OUTOF10: 1
PAINLEVEL_OUTOF10: 2
PAINLEVEL_OUTOF10: 3
PAINLEVEL_OUTOF10: 2

## 2019-06-27 VITALS
OXYGEN SATURATION: 95 % | HEIGHT: 72 IN | DIASTOLIC BLOOD PRESSURE: 77 MMHG | RESPIRATION RATE: 18 BRPM | SYSTOLIC BLOOD PRESSURE: 141 MMHG | BODY MASS INDEX: 39.68 KG/M2 | HEART RATE: 96 BPM | WEIGHT: 293 LBS | TEMPERATURE: 97.4 F

## 2019-06-27 PROBLEM — O99.213 MATERNAL MORBID OBESITY, ANTEPARTUM, THIRD TRIMESTER (HCC): Status: RESOLVED | Noted: 2019-05-21 | Resolved: 2019-06-27

## 2019-06-27 PROBLEM — E66.01 MATERNAL MORBID OBESITY, ANTEPARTUM, THIRD TRIMESTER (HCC): Status: RESOLVED | Noted: 2019-05-21 | Resolved: 2019-06-27

## 2019-06-27 PROBLEM — Z34.90 TERM PREGNANCY: Status: RESOLVED | Noted: 2019-06-24 | Resolved: 2019-06-27

## 2019-06-27 PROCEDURE — 6370000000 HC RX 637 (ALT 250 FOR IP): Performed by: OBSTETRICS & GYNECOLOGY

## 2019-06-27 PROCEDURE — 6360000002 HC RX W HCPCS: Performed by: OBSTETRICS & GYNECOLOGY

## 2019-06-27 PROCEDURE — 99024 POSTOP FOLLOW-UP VISIT: CPT | Performed by: ADVANCED PRACTICE MIDWIFE

## 2019-06-27 RX ORDER — IBUPROFEN 800 MG/1
800 TABLET ORAL EVERY 8 HOURS PRN
Qty: 50 TABLET | Refills: 1 | Status: SHIPPED | OUTPATIENT
Start: 2019-06-27 | End: 2019-09-10

## 2019-06-27 RX ADMIN — DOCUSATE SODIUM 100 MG: 100 CAPSULE, LIQUID FILLED ORAL at 08:17

## 2019-06-27 RX ADMIN — ACETAMINOPHEN 650 MG: 325 TABLET, FILM COATED ORAL at 08:17

## 2019-06-27 RX ADMIN — IBUPROFEN 800 MG: 800 TABLET, FILM COATED ORAL at 01:43

## 2019-06-27 RX ADMIN — ENOXAPARIN SODIUM 80 MG: 80 INJECTION SUBCUTANEOUS at 10:23

## 2019-06-27 ASSESSMENT — PAIN SCALES - GENERAL: PAINLEVEL_OUTOF10: 1

## 2019-06-27 ASSESSMENT — PAIN DESCRIPTION - DESCRIPTORS: DESCRIPTORS: SORE

## 2019-06-27 NOTE — PROGRESS NOTES
C/S  Labor and Delivery       Post Partum Progress Note           SUBJECTIVE:  3rd day primary   for macrosomia  Flatus:Present  BM:no  Diet: Tolerating regular diet   Ambulation: Ambulateswithout difficulty    OBJECTIVE:      Vitals:  /62   Pulse 76   Temp 98 °F (36.7 °C) (Oral)   Resp 20   Ht 6' (1.829 m)   Wt (!) 345 lb (156.5 kg)   LMP 2018 (Approximate)   SpO2 95%   Breastfeeding?  Unknown   BMI 46.79 kg/m²   Patient Vitals for the past 24 hrs:   BP Temp Temp src Pulse Resp   19 0142 128/62 -- -- 76 --   19 0141 -- 98 °F (36.7 °C) Oral -- 20   19 (!) 144/78 97.3 °F (36.3 °C) Oral 91 --       ABDOMEN:  No scars, normal bowel sounds, soft, non-distended, non-tender, no masses palpated, no hepatosplenomegally  INCISION: dressing in place, clean, dry and intact  GENITAL/URINARY:  External Genitalia:  General appearance; normal, Hair distribution; normal, Lesions absent  Cor: RRR no Murmurs  Pulmonary: clear to auscultation anterior and posterior  Extremities: no Clubbing cyanosis or ecchymosis    DATA:    CBC:   Lab Results   Component Value Date    WBC 15.7 2019    RBC 3.48 2019    HGB 9.0 2019    HCT 29.2 2019    MCV 83.9 2019    MCH 25.9 2019    MCHC 30.8 2019    RDW 13.8 2019     2019    MPV 10.8 2019       ASSESSMENT:      Patient Active Hospital Problem List:   Term pregnancy (2019)    Assessment: delivered     Macrosomia affecting management of mother in third trimester ()    Assessment: delivered by         delivery delivered ()          S/P C/S post op day # 3     PLAN:  D/c home, rto 1 and 6 weeks, routine instructions

## 2019-06-27 NOTE — PLAN OF CARE
Problem: Discharge Planning:  Goal: Discharged to appropriate level of care  Description  Discharged to appropriate level of care  6/25/2019 0024 by Tori Pink RN  Outcome: Ongoing  6/25/2019 0024 by Tori Pink, RN  Outcome: Ongoing
Thromboembolism:  Goal: Will show no signs or symptoms of venous thromboembolism  Description  Will show no signs or symptoms of venous thromboembolism  6/27/2019 0906 by Andrey Tinajero RN  Outcome: Completed  6/27/2019 0327 by Sarthak Logan RN  Outcome: Ongoing  Goal: Absence of signs or symptoms of impaired coagulation  Description  Absence of signs or symptoms of impaired coagulation  6/27/2019 0906 by Andrey Tinajero RN  Outcome: Completed  6/27/2019 0327 by Sarthak Logan RN  Outcome: Ongoing     Problem: Pain:  Goal: Pain level will decrease  Description  Pain level will decrease  6/27/2019 0906 by Andrey Tinajero RN  Outcome: Completed  6/27/2019 0327 by Sarthak Logan RN  Outcome: Ongoing  Goal: Control of acute pain  Description  Control of acute pain  6/27/2019 0906 by Andrey Tinajero RN  Outcome: Completed  6/27/2019 0327 by Sarthak Logan RN  Outcome: Ongoing  Goal: Control of chronic pain  Description  Control of chronic pain  6/27/2019 0906 by Andrey Tinajero RN  Outcome: Completed  6/27/2019 0327 by Sarthak Logan RN  Outcome: Ongoing

## 2019-07-03 ENCOUNTER — POSTPARTUM VISIT (OUTPATIENT)
Dept: OBGYN | Age: 34
End: 2019-07-03

## 2019-07-03 VITALS
DIASTOLIC BLOOD PRESSURE: 84 MMHG | BODY MASS INDEX: 39.68 KG/M2 | SYSTOLIC BLOOD PRESSURE: 126 MMHG | WEIGHT: 293 LBS | HEIGHT: 72 IN

## 2019-07-03 DIAGNOSIS — Z09 POSTOP CHECK: Primary | ICD-10-CM

## 2019-07-03 PROCEDURE — 99024 POSTOP FOLLOW-UP VISIT: CPT | Performed by: ADVANCED PRACTICE MIDWIFE

## 2019-07-03 PROCEDURE — G8427 DOCREV CUR MEDS BY ELIG CLIN: HCPCS | Performed by: ADVANCED PRACTICE MIDWIFE

## 2019-07-03 PROCEDURE — G8417 CALC BMI ABV UP PARAM F/U: HCPCS | Performed by: ADVANCED PRACTICE MIDWIFE

## 2019-07-03 PROCEDURE — 1036F TOBACCO NON-USER: CPT | Performed by: ADVANCED PRACTICE MIDWIFE

## 2019-07-03 PROCEDURE — 1111F DSCHRG MED/CURRENT MED MERGE: CPT | Performed by: ADVANCED PRACTICE MIDWIFE

## 2019-07-03 NOTE — PROGRESS NOTES
c/o     PT denies fever, chills, nausea and vomiting       Objective   No acute distress  Excellent communications  Well-nourished   GI: Abdomen soft, non-tender. BS normal. No masses,  No organomegaly, acticoat removed, incision well approximated, no redness drainage or heat, steri strips removed, care instructions reviewed                              Results reviewed today:    No results found for this visit on 07/03/19. Assessment and Plan          Diagnosis Orders   1. Postop check               I am having Jessy Ponce maintain her albuterol sulfate HFA, Prenatal MV-Min-Fe Fum-FA-DHA (PRENATAL 1 PO), calcium carbonate, pantoprazole, ferrous sulfate, and ibuprofen. Return in about 5 weeks (around 8/7/2019) for postpartum visit. There are no Patient Instructions on file for this visit. Over 50% of time spent on counseling and care coordination on: see assessment and plan,  She was also counseled on her preventative health maintenance recommendations and follow-up.         FF time: 15 min      Ernesto Gould,7/3/2019 9:29 AM

## 2019-07-08 ENCOUNTER — TELEPHONE (OUTPATIENT)
Dept: OBGYN | Age: 34
End: 2019-07-08

## 2019-07-09 DIAGNOSIS — K21.9 GASTROESOPHAGEAL REFLUX DISEASE WITHOUT ESOPHAGITIS: Primary | ICD-10-CM

## 2019-07-09 RX ORDER — PANTOPRAZOLE SODIUM 40 MG/1
40 TABLET, DELAYED RELEASE ORAL
Qty: 90 TABLET | Refills: 3 | Status: SHIPPED | OUTPATIENT
Start: 2019-07-09 | End: 2019-09-10 | Stop reason: SDUPTHER

## 2019-08-05 ENCOUNTER — POSTPARTUM VISIT (OUTPATIENT)
Dept: OBGYN | Age: 34
End: 2019-08-05

## 2019-08-05 VITALS
SYSTOLIC BLOOD PRESSURE: 132 MMHG | WEIGHT: 293 LBS | HEIGHT: 72 IN | BODY MASS INDEX: 39.68 KG/M2 | DIASTOLIC BLOOD PRESSURE: 80 MMHG

## 2019-08-05 LAB — HGB, POC: 10.9

## 2019-08-05 PROCEDURE — 0503F POSTPARTUM CARE VISIT: CPT | Performed by: ADVANCED PRACTICE MIDWIFE

## 2019-08-05 RX ORDER — FLUOXETINE 10 MG/1
10 CAPSULE ORAL DAILY
Qty: 30 CAPSULE | Refills: 5 | Status: SHIPPED | OUTPATIENT
Start: 2019-08-05 | End: 2019-12-10 | Stop reason: ALTCHOICE

## 2019-09-10 ENCOUNTER — OFFICE VISIT (OUTPATIENT)
Dept: OBGYN | Age: 34
End: 2019-09-10
Payer: COMMERCIAL

## 2019-09-10 VITALS
DIASTOLIC BLOOD PRESSURE: 78 MMHG | BODY MASS INDEX: 39.68 KG/M2 | WEIGHT: 293 LBS | HEIGHT: 72 IN | SYSTOLIC BLOOD PRESSURE: 126 MMHG

## 2019-09-10 PROCEDURE — G8427 DOCREV CUR MEDS BY ELIG CLIN: HCPCS | Performed by: ADVANCED PRACTICE MIDWIFE

## 2019-09-10 PROCEDURE — G8417 CALC BMI ABV UP PARAM F/U: HCPCS | Performed by: ADVANCED PRACTICE MIDWIFE

## 2019-09-10 PROCEDURE — 1036F TOBACCO NON-USER: CPT | Performed by: ADVANCED PRACTICE MIDWIFE

## 2019-09-10 PROCEDURE — 99213 OFFICE O/P EST LOW 20 MIN: CPT | Performed by: ADVANCED PRACTICE MIDWIFE

## 2019-09-10 RX ORDER — FLUOXETINE HYDROCHLORIDE 20 MG/1
20 CAPSULE ORAL DAILY
Qty: 30 CAPSULE | Refills: 3 | Status: SHIPPED | OUTPATIENT
Start: 2019-09-10 | End: 2021-04-21

## 2019-09-10 NOTE — PROGRESS NOTES
PROBLEM VISIT     Date of service: 9/10/2019    Rizwan Lundy  Is a 36 y.o.  female    PT's PCP is: Ryan Gresham DO     : 1985                                             Subjective:       Patient's last menstrual period was 2019 (approximate). OB History    Para Term  AB Living   1 1 1     1   SAB TAB Ectopic Molar Multiple Live Births           0 1      # Outcome Date GA Lbr Wallace/2nd Weight Sex Delivery Anes PTL Lv   1 Term 19 40w2d  9 lb 12.7 oz (4.442 kg) F CS-LTranv Spinal N HU        Social History     Tobacco Use   Smoking Status Never Smoker   Smokeless Tobacco Never Used        Social History     Substance and Sexual Activity   Alcohol Use No       Social History     Substance and Sexual Activity   Sexual Activity Not Currently    Partners: Male       Allergies: Aspartame and phenylalanine    Chief Complaint   Patient presents with    Depression     Medication follow up Patient currently taking Prozac 10 mg however feels \"irritated\" all the time and anxiety. Patient returning to work next week. Increase dose. ? Last Yearly:  2018    Last pap: 2018    Last HPV: 2018    PE:  Vital Signs  Blood pressure 126/78, height 6' (1.829 m), weight (!) 321 lb 6.4 oz (145.8 kg), last menstrual period 2019, currently breastfeeding. NURSE: Siria YUNG    HPI: patient here f/u prozac for mild pp depression, \"better\" but \"could be better than this\"      PT denies fever, chills, nausea and vomiting       Objective: No acute distress  Excellent communications  Well-nourished    Results reviewed today:    No results found for this visit on 09/10/19. Assessment and Plan          Diagnosis Orders   1. Mild postpartum depression  FLUoxetine (PROZAC) 20 MG capsule       will trial increasing dose, reviewed danger signs      I have discontinued Boo Fields's calcium carbonate, ferrous sulfate, and ibuprofen.  I am also having her start

## 2019-12-10 ENCOUNTER — OFFICE VISIT (OUTPATIENT)
Dept: OBGYN | Age: 34
End: 2019-12-10
Payer: COMMERCIAL

## 2019-12-10 ENCOUNTER — HOSPITAL ENCOUNTER (OUTPATIENT)
Age: 34
Setting detail: SPECIMEN
Discharge: HOME | End: 2019-12-10
Payer: COMMERCIAL

## 2019-12-10 VITALS
SYSTOLIC BLOOD PRESSURE: 124 MMHG | DIASTOLIC BLOOD PRESSURE: 80 MMHG | HEIGHT: 72 IN | BODY MASS INDEX: 39.68 KG/M2 | WEIGHT: 293 LBS

## 2019-12-10 DIAGNOSIS — Z01.419 ENCOUNTER FOR ANNUAL ROUTINE GYNECOLOGICAL EXAMINATION: Primary | ICD-10-CM

## 2019-12-10 DIAGNOSIS — Z01.419 ENCOUNTER FOR ANNUAL ROUTINE GYNECOLOGICAL EXAMINATION: ICD-10-CM

## 2019-12-10 PROCEDURE — G8484 FLU IMMUNIZE NO ADMIN: HCPCS | Performed by: ADVANCED PRACTICE MIDWIFE

## 2019-12-10 PROCEDURE — 87624 HPV HI-RISK TYP POOLED RSLT: CPT

## 2019-12-10 PROCEDURE — 99395 PREV VISIT EST AGE 18-39: CPT | Performed by: ADVANCED PRACTICE MIDWIFE

## 2019-12-10 PROCEDURE — G0145 SCR C/V CYTO,THINLAYER,RESCR: HCPCS

## 2019-12-10 RX ORDER — FLUOXETINE HYDROCHLORIDE 20 MG/1
20 CAPSULE ORAL DAILY
Qty: 30 CAPSULE | Refills: 11 | Status: SHIPPED | OUTPATIENT
Start: 2019-12-10 | End: 2022-04-30 | Stop reason: DRUGHIGH

## 2019-12-10 RX ORDER — LORATADINE 10 MG/1
10 TABLET ORAL DAILY
COMMUNITY

## 2019-12-10 ASSESSMENT — PATIENT HEALTH QUESTIONNAIRE - PHQ9
1. LITTLE INTEREST OR PLEASURE IN DOING THINGS: 0
SUM OF ALL RESPONSES TO PHQ QUESTIONS 1-9: 0
SUM OF ALL RESPONSES TO PHQ QUESTIONS 1-9: 0
2. FEELING DOWN, DEPRESSED OR HOPELESS: 0
SUM OF ALL RESPONSES TO PHQ9 QUESTIONS 1 & 2: 0

## 2019-12-12 LAB
CYTOLOGY REPORT: NORMAL
HPV SAMPLE: NORMAL
HPV, GENOTYPE 16: NOT DETECTED
HPV, GENOTYPE 18: NOT DETECTED
HPV, HIGH RISK OTHER: NOT DETECTED
HPV, INTERPRETATION: NORMAL
SPECIMEN DESCRIPTION: NORMAL

## 2019-12-15 ASSESSMENT — ENCOUNTER SYMPTOMS
ABDOMINAL PAIN: 0
SORE THROAT: 0
BACK PAIN: 0
SHORTNESS OF BREATH: 0

## 2020-01-29 ENCOUNTER — HOSPITAL ENCOUNTER (OUTPATIENT)
Dept: LAB | Age: 35
Setting detail: SPECIMEN
Discharge: HOME | End: 2020-01-29
Payer: COMMERCIAL

## 2020-01-29 LAB
DIRECT EXAM: NORMAL
DIRECT EXAM: NORMAL
Lab: NORMAL
Lab: NORMAL
SPECIMEN DESCRIPTION: NORMAL
SPECIMEN DESCRIPTION: NORMAL

## 2020-01-29 PROCEDURE — 87804 INFLUENZA ASSAY W/OPTIC: CPT

## 2020-01-29 PROCEDURE — 87807 RSV ASSAY W/OPTIC: CPT

## 2021-03-04 ENCOUNTER — TELEPHONE (OUTPATIENT)
Dept: OBGYN | Age: 36
End: 2021-03-04

## 2021-03-04 ENCOUNTER — HOSPITAL ENCOUNTER (OUTPATIENT)
Age: 36
Discharge: HOME | End: 2021-03-04
Payer: COMMERCIAL

## 2021-03-04 DIAGNOSIS — O20.0 THREATENED ABORTION: Primary | ICD-10-CM

## 2021-03-04 DIAGNOSIS — O20.0 THREATENED ABORTION: ICD-10-CM

## 2021-03-04 LAB
ABO/RH: NORMAL
ANTIBODY SCREEN: NEGATIVE
HCG QUANTITATIVE: 3197 IU/L

## 2021-03-04 PROCEDURE — 36415 COLL VENOUS BLD VENIPUNCTURE: CPT

## 2021-03-04 PROCEDURE — 84702 CHORIONIC GONADOTROPIN TEST: CPT

## 2021-03-04 PROCEDURE — 86901 BLOOD TYPING SEROLOGIC RH(D): CPT

## 2021-03-04 PROCEDURE — 86850 RBC ANTIBODY SCREEN: CPT

## 2021-03-04 PROCEDURE — 86900 BLOOD TYPING SEROLOGIC ABO: CPT

## 2021-03-04 NOTE — TELEPHONE ENCOUNTER
Patient has new OB scheduled for next week thinks she is approx. 5 weeks pregnant. Patient has had spotting last several days. HCG, type and screen ?

## 2021-03-05 DIAGNOSIS — O20.0 THREATENED ABORTION: Primary | ICD-10-CM

## 2021-03-06 ENCOUNTER — HOSPITAL ENCOUNTER (OUTPATIENT)
Age: 36
Discharge: HOME | End: 2021-03-06
Payer: COMMERCIAL

## 2021-03-06 DIAGNOSIS — O20.0 THREATENED ABORTION: ICD-10-CM

## 2021-03-06 LAB — HCG QUANTITATIVE: 1894 IU/L

## 2021-03-06 PROCEDURE — 36415 COLL VENOUS BLD VENIPUNCTURE: CPT

## 2021-03-06 PROCEDURE — 84702 CHORIONIC GONADOTROPIN TEST: CPT

## 2021-03-08 ENCOUNTER — HOSPITAL ENCOUNTER (OUTPATIENT)
Age: 36
Discharge: HOME | End: 2021-03-08
Payer: COMMERCIAL

## 2021-03-08 ENCOUNTER — TELEPHONE (OUTPATIENT)
Dept: OBGYN | Age: 36
End: 2021-03-08

## 2021-03-08 DIAGNOSIS — O03.4 INCOMPLETE ABORTION: ICD-10-CM

## 2021-03-08 DIAGNOSIS — O03.4 INCOMPLETE ABORTION: Primary | ICD-10-CM

## 2021-03-08 LAB — HCG QUANTITATIVE: 590 IU/L

## 2021-03-08 PROCEDURE — 84702 CHORIONIC GONADOTROPIN TEST: CPT

## 2021-03-08 PROCEDURE — 36415 COLL VENOUS BLD VENIPUNCTURE: CPT

## 2021-03-10 ENCOUNTER — OFFICE VISIT (OUTPATIENT)
Dept: OBGYN | Age: 36
End: 2021-03-10
Payer: COMMERCIAL

## 2021-03-10 VITALS
HEIGHT: 72 IN | DIASTOLIC BLOOD PRESSURE: 84 MMHG | SYSTOLIC BLOOD PRESSURE: 124 MMHG | BODY MASS INDEX: 39.68 KG/M2 | WEIGHT: 293 LBS

## 2021-03-10 DIAGNOSIS — O03.4 INCOMPLETE ABORTION: Primary | ICD-10-CM

## 2021-03-10 LAB — HGB, POC: 11.7

## 2021-03-10 PROCEDURE — G8484 FLU IMMUNIZE NO ADMIN: HCPCS | Performed by: ADVANCED PRACTICE MIDWIFE

## 2021-03-10 PROCEDURE — 99212 OFFICE O/P EST SF 10 MIN: CPT | Performed by: ADVANCED PRACTICE MIDWIFE

## 2021-03-10 PROCEDURE — G8427 DOCREV CUR MEDS BY ELIG CLIN: HCPCS | Performed by: ADVANCED PRACTICE MIDWIFE

## 2021-03-10 PROCEDURE — 1036F TOBACCO NON-USER: CPT | Performed by: ADVANCED PRACTICE MIDWIFE

## 2021-03-10 PROCEDURE — 85018 HEMOGLOBIN: CPT | Performed by: ADVANCED PRACTICE MIDWIFE

## 2021-03-10 PROCEDURE — G8417 CALC BMI ABV UP PARAM F/U: HCPCS | Performed by: ADVANCED PRACTICE MIDWIFE

## 2021-03-10 RX ORDER — MONTELUKAST SODIUM 4 MG/1
TABLET, CHEWABLE ORAL
COMMUNITY
End: 2022-04-30 | Stop reason: ALTCHOICE

## 2021-03-10 RX ORDER — ACETAZOLAMIDE 500 MG/1
250 CAPSULE, EXTENDED RELEASE ORAL
COMMUNITY

## 2021-03-10 NOTE — PROGRESS NOTES
PROBLEM VISIT     Date of service: 3/10/2021    Carlos Carrero  Is a 30 y.o.  female    PT's PCP is: Guy Valdivia DO     : 1985                                             Subjective:       Patient's last menstrual period was 2021 (approximate). OB History    Para Term  AB Living   2 1 1   1 1   SAB TAB Ectopic Molar Multiple Live Births   1       0 1      # Outcome Date GA Lbr Wallace/2nd Weight Sex Delivery Anes PTL Lv   2 SAB 2021     SAB      1 Term 19 40w2d  9 lb 12.7 oz (4.442 kg) F CS-LTranv Spinal N HU        Social History     Tobacco Use   Smoking Status Never Smoker   Smokeless Tobacco Never Used        Social History     Substance and Sexual Activity   Alcohol Use No       Social History     Substance and Sexual Activity   Sexual Activity Yes    Partners: Male       Allergies: Aspartame and phenylalanine    Chief Complaint   Patient presents with    Miscarriage     Follow up SAB, follow labs. Patient has had heavy bleeding for 4-5 days. Clots off and on. Last Yearly:  2019    Last pap: 2019    Last HPV: na    PE:  Vital Signs  Blood pressure 124/84, height 6' (1.829 m), weight (!) 334 lb (151.5 kg), last menstrual period 2021, not currently breastfeeding. Estimated body mass index is 45.3 kg/m² as calculated from the following:    Height as of this encounter: 6' (1.829 m). Weight as of this encounter: 334 lb (151.5 kg). No data recorded      NURSE: sebastian YUNG    HPI: patient started with spontaneously bleeding, quants are dropping by approximately 50% every 48 hours, minimal cramping, still bleeding \"like a heavier period\"      PT denies fever, chills, nausea and vomiting       Objective: No acute distress  Excellent communications  Well-nourished    Results reviewed today:    No results found for this visit on 03/10/21. Assessment and Plan          Diagnosis Orders   1.  Incomplete   POCT hemoglobin HCG, Quantitative, Pregnancy     Appears as completing ab; will start OTC iron and recheck levels in one week. Reviewed danger signs of heavy bleeding or pain        I am having Trixie Hopes maintain her albuterol sulfate HFA, Prenatal MV-Min-Fe Fum-FA-DHA (PRENATAL 1 PO), pantoprazole, FLUoxetine, loratadine, FLUoxetine, colestipol, and acetaZOLAMIDE. Return in about 2 weeks (around 3/24/2021) for f/u sab. There are no Patient Instructions on file for this visit. Over 50% of time spent on counseling and care coordination on: see assessment and plan,  She was also counseled on her preventative health maintenance recommendations and follow-up.         FF time: 15 min      You Gould,3/10/2021 11:30 AM

## 2021-03-17 ENCOUNTER — HOSPITAL ENCOUNTER (OUTPATIENT)
Age: 36
Discharge: HOME | End: 2021-03-17
Payer: COMMERCIAL

## 2021-03-17 DIAGNOSIS — O03.4 INCOMPLETE ABORTION: ICD-10-CM

## 2021-03-17 DIAGNOSIS — O03.9 MISCARRIAGE: Primary | ICD-10-CM

## 2021-03-17 LAB — HCG QUANTITATIVE: 67 IU/L

## 2021-03-17 PROCEDURE — 36415 COLL VENOUS BLD VENIPUNCTURE: CPT

## 2021-03-17 PROCEDURE — 84702 CHORIONIC GONADOTROPIN TEST: CPT

## 2021-03-23 ENCOUNTER — OFFICE VISIT (OUTPATIENT)
Dept: OBGYN | Age: 36
End: 2021-03-23
Payer: COMMERCIAL

## 2021-03-23 VITALS
WEIGHT: 293 LBS | DIASTOLIC BLOOD PRESSURE: 74 MMHG | SYSTOLIC BLOOD PRESSURE: 128 MMHG | HEIGHT: 72 IN | BODY MASS INDEX: 39.68 KG/M2

## 2021-03-23 DIAGNOSIS — O03.4 INCOMPLETE ABORTION: ICD-10-CM

## 2021-03-23 DIAGNOSIS — O03.9 MISCARRIAGE: Primary | ICD-10-CM

## 2021-03-23 LAB
CONTROL: ABNORMAL
PREGNANCY TEST URINE, POC: POSITIVE

## 2021-03-23 PROCEDURE — G8427 DOCREV CUR MEDS BY ELIG CLIN: HCPCS | Performed by: ADVANCED PRACTICE MIDWIFE

## 2021-03-23 PROCEDURE — G8484 FLU IMMUNIZE NO ADMIN: HCPCS | Performed by: ADVANCED PRACTICE MIDWIFE

## 2021-03-23 PROCEDURE — G8417 CALC BMI ABV UP PARAM F/U: HCPCS | Performed by: ADVANCED PRACTICE MIDWIFE

## 2021-03-23 PROCEDURE — 1036F TOBACCO NON-USER: CPT | Performed by: ADVANCED PRACTICE MIDWIFE

## 2021-03-23 PROCEDURE — 81025 URINE PREGNANCY TEST: CPT | Performed by: ADVANCED PRACTICE MIDWIFE

## 2021-03-23 PROCEDURE — 99212 OFFICE O/P EST SF 10 MIN: CPT | Performed by: ADVANCED PRACTICE MIDWIFE

## 2021-03-23 ASSESSMENT — PATIENT HEALTH QUESTIONNAIRE - PHQ9
2. FEELING DOWN, DEPRESSED OR HOPELESS: 1
1. LITTLE INTEREST OR PLEASURE IN DOING THINGS: 1
SUM OF ALL RESPONSES TO PHQ9 QUESTIONS 1 & 2: 2
SUM OF ALL RESPONSES TO PHQ QUESTIONS 1-9: 2

## 2021-03-23 NOTE — PROGRESS NOTES
PROBLEM VISIT     Date of service: 3/23/2021    Alexis Terrazas  Is a 30 y.o.  female    PT's PCP is: Merle Linares DO     : 1985                                             Subjective:       No LMP recorded. OB History    Para Term  AB Living   2 1 1   1 1   SAB TAB Ectopic Molar Multiple Live Births   1       0 1      # Outcome Date GA Lbr Wallace/2nd Weight Sex Delivery Anes PTL Lv   2 SAB 2021     SAB      1 Term 19 40w2d  9 lb 12.7 oz (4.442 kg) F CS-LTranv Spinal N HU        Social History     Tobacco Use   Smoking Status Never Smoker   Smokeless Tobacco Never Used        Social History     Substance and Sexual Activity   Alcohol Use No       Social History     Substance and Sexual Activity   Sexual Activity Yes    Partners: Male       Allergies: Aspartame and phenylalanine    Chief Complaint   Patient presents with    Miscarriage     Follow up SAB. Last Yearly: 2019    Last pap: 2019    Last HPV:     PE:  Vital Signs  Height 6' (1.829 m), not currently breastfeeding. Estimated body mass index is 45.3 kg/m² as calculated from the following:    Height as of this encounter: 6' (1.829 m). Weight as of 3/10/21: 334 lb (151.5 kg). No data recorded      NURSE: Kj YUNG    HPI: here for f/u sab, urine hcg continues positive, pt reports bleeding has stopped      PT denies fever, chills, nausea and vomiting       Objective: No acute distress  Excellent communications  Well-nourished    Results reviewed today:    No results found for this visit on 21. Assessment and Plan          Diagnosis Orders   1. Miscarriage  POCT urine pregnancy   2. Incomplete   HCG, Quantitative, Pregnancy             I am having Alexis Terrazas maintain her albuterol sulfate HFA, Prenatal MV-Min-Fe Fum-FA-DHA (PRENATAL 1 PO), pantoprazole, FLUoxetine, loratadine, FLUoxetine, colestipol, and acetaZOLAMIDE.     Return for will call with results. There are no Patient Instructions on file for this visit. Over 50% of time spent on counseling and care coordination on: see assessment and plan,  She was also counseled on her preventative health maintenance recommendations and follow-up.         FF time: 15 min      Abelardo Gould,3/23/2021 9:11 PM

## 2021-03-31 ENCOUNTER — HOSPITAL ENCOUNTER (OUTPATIENT)
Age: 36
Discharge: HOME | End: 2021-03-31
Payer: COMMERCIAL

## 2021-03-31 DIAGNOSIS — O03.4 INCOMPLETE ABORTION: ICD-10-CM

## 2021-03-31 LAB — HCG QUANTITATIVE: 10 IU/L

## 2021-03-31 PROCEDURE — 36415 COLL VENOUS BLD VENIPUNCTURE: CPT

## 2021-03-31 PROCEDURE — 84702 CHORIONIC GONADOTROPIN TEST: CPT

## 2021-04-21 ENCOUNTER — OFFICE VISIT (OUTPATIENT)
Dept: OBGYN | Age: 36
End: 2021-04-21
Payer: COMMERCIAL

## 2021-04-21 ENCOUNTER — HOSPITAL ENCOUNTER (OUTPATIENT)
Age: 36
Setting detail: SPECIMEN
Discharge: HOME | End: 2021-04-21
Payer: COMMERCIAL

## 2021-04-21 VITALS
DIASTOLIC BLOOD PRESSURE: 84 MMHG | WEIGHT: 293 LBS | BODY MASS INDEX: 39.68 KG/M2 | SYSTOLIC BLOOD PRESSURE: 136 MMHG | HEIGHT: 72 IN

## 2021-04-21 DIAGNOSIS — R10.9 ABDOMINAL PRESSURE: ICD-10-CM

## 2021-04-21 DIAGNOSIS — Z01.419 ENCOUNTER FOR ANNUAL ROUTINE GYNECOLOGICAL EXAMINATION: Primary | ICD-10-CM

## 2021-04-21 DIAGNOSIS — N64.52 BILATERAL NIPPLE DISCHARGE: ICD-10-CM

## 2021-04-21 LAB
BILIRUBIN, POC: NEGATIVE
BLOOD URINE, POC: NORMAL
CLARITY, POC: CLEAR
COLOR, POC: NORMAL
GLUCOSE URINE, POC: NEGATIVE
KETONES, POC: NEGATIVE
LEUKOCYTE EST, POC: NORMAL
NITRITE, POC: NORMAL
PH, POC: 6
PROTEIN, POC: NORMAL
SPECIFIC GRAVITY, POC: 1.02
UROBILINOGEN, POC: NORMAL

## 2021-04-21 PROCEDURE — 87086 URINE CULTURE/COLONY COUNT: CPT

## 2021-04-21 PROCEDURE — 88160 CYTOPATH SMEAR OTHER SOURCE: CPT

## 2021-04-21 PROCEDURE — 99395 PREV VISIT EST AGE 18-39: CPT | Performed by: ADVANCED PRACTICE MIDWIFE

## 2021-04-21 PROCEDURE — 81002 URINALYSIS NONAUTO W/O SCOPE: CPT | Performed by: ADVANCED PRACTICE MIDWIFE

## 2021-04-21 RX ORDER — FLUOXETINE HYDROCHLORIDE 20 MG/1
20 CAPSULE ORAL DAILY
Qty: 90 CAPSULE | Refills: 3 | Status: SHIPPED | OUTPATIENT
Start: 2021-04-21

## 2021-04-21 ASSESSMENT — ENCOUNTER SYMPTOMS
SHORTNESS OF BREATH: 0
ABDOMINAL PAIN: 0
BACK PAIN: 0

## 2021-04-21 NOTE — PROGRESS NOTES
  Prenatal MV-Min-Fe Fum-FA-DHA (PRENATAL 1 PO), Take by mouth daily, Disp: , Rfl:     albuterol sulfate  (90 BASE) MCG/ACT inhaler, Inhale 2 puffs into the lungs every 6 hours as needed for Wheezing, Disp: , Rfl:     Social History     Substance and Sexual Activity   Sexual Activity Not Currently    Partners: Male       Any bleeding or pain with intercourse: Yes    Last Yearly:  2019    Last pap:     Last HPV:     Last Mammogram:     Last Elmarie Keep never    Last colorectal screen- type:colonoscopy*  date      Do you do self breast exams: Yes    Past Medical History:   Diagnosis Date    Asthma     Attention deficit hyperactivity disorder     GERD (gastroesophageal reflux disease)     Hypertension     intercranial hypertension     Pseudotumor cerebri     Psychiatric problem     Anxiety       Past Surgical History:   Procedure Laterality Date     SECTION N/A 2019     SECTION performed by Awa Carter DO at [facility] AT [facility] L[facility]D [facility]    CHOLECYSTECTOMY      2018    WISDOM TOOTH EXTRACTION  2014       Family History   Problem Relation Age of Onset    Lung Cancer Paternal Grandfather         black lung    No Known Problems Paternal Grandmother     Alzheimer's Disease Maternal Grandmother     Diabetes Maternal Grandmother     Heart Disease Maternal Grandfather     Alzheimer's Disease Maternal Grandfather     Heart Disease Father     Depression Mother     Thyroid Disease Mother     No Known Problems Brother     No Known Problems Sister     Anxiety Disorder Brother        Chief Complaint   Patient presents with    Gynecologic Exam     Yearly exam. Patient last pap 12/10/2019 negative, HPV not detected. C/O bilateral nipple discharge. daily. C/O off and on vaginal pain, pressure. PE: Vital Signs  Blood pressure 136/84, height 6' (1.829 m), weight (!) 328 lb (148.8 kg), last menstrual period 2021, not currently breastfeeding.   Estimated body mass index is 44.48 kg/m² as calculated from the following:    Height as of this encounter: 6' (1.829 m). Weight as of this encounter: 328 lb (148.8 kg). Labs:    Results for orders placed or performed in visit on 04/21/21   POCT Urinalysis no Micro   Result Value Ref Range    Color, UA pale yellow     Clarity, UA clear     Glucose, UA POC negative     Bilirubin, UA negative     Ketones, UA negative     Spec Grav, UA 1.020     Blood, UA POC negtive     pH, UA 6     Protein, UA POC neg     Urobilinogen, UA neg     Leukocytes, UA neg     Nitrite, UA neg        No data recorded    NURSE: Siria YUNG    HPI: here for annual gyn exam; s/p complete sab; c/o pelvic/vaginal pressure, possible uti; also c/o bilateral nipple discharge    Review of Systems   Constitutional: Negative. HENT: Negative for congestion. Respiratory: Negative for shortness of breath. Cardiovascular: Negative for chest pain. Gastrointestinal: Negative for abdominal pain. Genitourinary: Positive for pelvic pain. Negative for dysuria. Musculoskeletal: Negative for back pain. Skin: Negative for rash. Neurological: Negative for headaches. Psychiatric/Behavioral: The patient is not nervous/anxious. Stable           Objective  Lymphatic:   no lymphadenopathy  Heent:   negative   Cor: regular rate and rhythm, no murmurs              Pul:clear to auscultation bilaterally- no wheezes, rales or rhonchi, normal air movement, no respiratory distress      GI: Abdomen soft, non-tender. BS normal. No masses,  No organomegaly           Extremities: normal strength, tone, and muscle mass   Breasts: Breast:normal appearance, no masses or tenderness   Pelvic Exam: GENITAL/URINARY:  External Genitalia:  General appearance; normal, Hair distribution; normal, Lesions absent  Urethral Meatus:  Size normal, Location normal, Lesions absent, Prolapse absent  Urethra:   Fullness absent, Masses absent  Bladder:  Fullness absent, Masses absent, Tenderness absent, Cystocele absent  Vagina:  General appearance normal, Estrogen effect normal, Discharge absent, Lesions absent, Pelvic support normal  Cervix:  General appearance normal, Lesions absent, Discharge absent, Tenderness absent, Enlargement absent, Nodularity absent  Uterus:  Size normal, Tenderness absent  Adenexa: Masses absent, Tenderness absent  Anus/Perineum:  Lesions absent and Masses absent                                                            Assessment and Plan          Diagnosis Orders   1. Encounter for annual routine gynecological examination     2. Abdominal pressure  POCT Urinalysis no Micro    Culture, Urine   3. Bilateral nipple discharge  Cytology, Non-Gyn    Cytology, Non-Gyn             I am having Winsome Devine start on FLUoxetine. I am also having her maintain her albuterol sulfate HFA, Prenatal MV-Min-Fe Fum-FA-DHA (PRENATAL 1 PO), pantoprazole, loratadine, FLUoxetine, colestipol, and acetaZOLAMIDE. Return in about 1 year (around 4/21/2022), or if symptoms worsen or fail to improve, for yearly. She was also counseled on her preventative health maintenance recommendations and follow-up.      Patient Instructions   annie Gould,4/21/2021 1:30 PM

## 2021-04-22 LAB
CASE NUMBER:: NORMAL
CASE NUMBER:: NORMAL
CULTURE: NORMAL
Lab: NORMAL
SPECIMEN DESCRIPTION: NORMAL
SURGICAL PATHOLOGY REPORT: NORMAL

## 2022-04-08 ENCOUNTER — HOSPITAL ENCOUNTER (OUTPATIENT)
Dept: NON INVASIVE DIAGNOSTICS | Age: 37
Discharge: HOME | End: 2022-04-08
Payer: COMMERCIAL

## 2022-04-08 DIAGNOSIS — I49.9 CARDIAC ARRHYTHMIA, UNSPECIFIED CARDIAC ARRHYTHMIA TYPE: ICD-10-CM

## 2022-04-08 LAB
LV EF: 65 %
LVEF MODALITY: NORMAL

## 2022-04-08 PROCEDURE — 93243 EXT ECG>48HR<7D SCAN A/R: CPT

## 2022-04-08 PROCEDURE — 93306 TTE W/DOPPLER COMPLETE: CPT

## 2022-04-08 PROCEDURE — 93242 EXT ECG>48HR<7D RECORDING: CPT

## 2022-04-08 NOTE — PROGRESS NOTES
Patient instructed on extended cardiac monitor indications and use. Diary sent with patient. Explained policies and procedures of an echocardiogram/doppler study.

## 2022-04-22 NOTE — PROCEDURES
[address] [facility] [place] [id number]                                 EVENT MONITOR    PATIENT NAME: Patrick Huang                      :        1985  MED REC NO:   [id number]                              ROOM:  ACCOUNT NO:   [de-identified]                           ADMIT DATE: 2022  PROVIDER:     Delmy Vincent MD    CARDIOVASCULAR DIAGNOSTIC DEPARTMENT    DATE OF STUDY:  2022    ORDERING PROVIDER:  Rosalina Quintana RN, [address] [place] [address]    PRIMARY CARE PROVIDER:  Hugh Mchugh DO    INTERPRETING PHYSICIAN:  Delmy Vincent MD    DIAGNOSIS:  Cardiac arrhythmia, unspecified. PHYSICIAN INTERPRETATION:  1.  6 days and 19 hours recorded. 2.  Baseline rhythm is sinus with average heart rate of 84 bpm ranging  between 53 and 156 bpm.  3.  Sinus tachycardia represented 12% of the study duration. 4.  No significant bradycardia or pauses. 5.  Rare isolated PACs noted. 6.  No ventricular arrhythmia recorded. 7.  Frequent patient-activated events reported, correlated with sinus  rhythm, sinus tachycardia and sinus arrhythmia. Jacque Laguna MD    D: 2022 15:06:49       T: 2022 15:08:07     MIRNA/SERENA_NANCY  Job#: [id number]     Doc#: Unknown    CC:  Namrata Quintana RN CFNP       Hugh Mchugh DO

## 2022-04-28 ENCOUNTER — OFFICE VISIT (OUTPATIENT)
Dept: OBGYN | Age: 37
End: 2022-04-28
Payer: COMMERCIAL

## 2022-04-28 VITALS
BODY MASS INDEX: 39.68 KG/M2 | HEIGHT: 72 IN | WEIGHT: 293 LBS | SYSTOLIC BLOOD PRESSURE: 130 MMHG | DIASTOLIC BLOOD PRESSURE: 80 MMHG

## 2022-04-28 DIAGNOSIS — Z01.419 ENCOUNTER FOR ANNUAL ROUTINE GYNECOLOGICAL EXAMINATION: Primary | ICD-10-CM

## 2022-04-28 PROCEDURE — 99395 PREV VISIT EST AGE 18-39: CPT | Performed by: ADVANCED PRACTICE MIDWIFE

## 2022-04-28 RX ORDER — M-VIT,TX,IRON,MINS/CALC/FOLIC 27MG-0.4MG
1 TABLET ORAL DAILY
COMMUNITY

## 2022-04-28 RX ORDER — MULTIVIT-MIN/IRON/FOLIC ACID/K 18-600-40
CAPSULE ORAL
COMMUNITY

## 2022-04-28 NOTE — PROGRESS NOTES
YEARLY PHYSICAL    Date of service: 2022    Adnrew Olmos  Is a 29 y.o.   female    PT's PCP is: Neel North DO     : 1985                                             Subjective:       Patient's last menstrual period was 2022 (exact date). Are your menses regular: no    OB History    Para Term  AB Living   2 1 1   1 1   SAB IAB Ectopic Molar Multiple Live Births   1       0 1      # Outcome Date GA Lbr Wallace/2nd Weight Sex Delivery Anes PTL Lv   2 SAB 2021     SAB      1 Term 19 40w2d  9 lb 12.7 oz (4.442 kg) F CS-LTranv Spinal N HU        Social History     Tobacco Use   Smoking Status Never Smoker   Smokeless Tobacco Never Used        Social History     Substance and Sexual Activity   Alcohol Use No       Family History   Problem Relation Age of Onset    Depression Mother     Thyroid Disease Mother     Heart Disease Father     No Known Problems Sister     No Known Problems Brother     Anxiety Disorder Brother     Alzheimer's Disease Maternal Grandmother     Diabetes Maternal Grandmother     Heart Disease Maternal Grandfather     Alzheimer's Disease Maternal Grandfather     No Known Problems Paternal Grandmother     Lung Cancer Paternal Grandfather         black lung       Any family history of breast or ovarian cancer: Yes, mat.  Aunt ovarian    Any family history of blood clots:  mat cousin PE    Have you had a positive covid test: No    Have you had the covid immunization: Yes      Allergies: Aspartame and phenylalanine      Current Outpatient Medications:     Multiple Vitamins-Minerals (THERAPEUTIC MULTIVITAMIN-MINERALS) tablet, Take 1 tablet by mouth daily, Disp: , Rfl:     Cholecalciferol (VITAMIN D) 50 MCG (2000) CAPS capsule, Take by mouth, Disp: , Rfl:     FLUoxetine (PROZAC) 20 MG capsule, Take 1 capsule by mouth daily (Patient taking differently: Take 10 mg by mouth daily ), Disp: 90 capsule, Rfl: 3    acetaZOLAMIDE (DIAMOX SEQUELS) 500 MG extended release capsule, 250 mg , Disp: , Rfl:     loratadine (CLARITIN) 10 MG tablet, Take 10 mg by mouth daily, Disp: , Rfl:     pantoprazole (PROTONIX) 40 MG tablet, Take 40 mg by mouth daily, Disp: , Rfl:     colestipol (COLESTID) 1 g tablet, , Disp: , Rfl:     FLUoxetine (PROZAC) 20 MG capsule, Take 1 capsule by mouth daily, Disp: 30 capsule, Rfl: 11    Prenatal MV-Min-Fe Fum-FA-DHA (PRENATAL 1 PO), Take by mouth daily (Patient not taking: Reported on 2022), Disp: , Rfl:     albuterol sulfate  (90 BASE) MCG/ACT inhaler, Inhale 2 puffs into the lungs every 6 hours as needed for Wheezing, Disp: , Rfl:     Social History     Substance and Sexual Activity   Sexual Activity Not Currently    Partners: Male       Any bleeding or pain with intercourse:  na  Last Yearly:  2019*    Last pap:     Last HPV:     Last Mammogram: 2013    Last Dexascan never    Last colorectal screen- type:colonoscopy*  date      Do you do self breast exams: Yes    Past Medical History:   Diagnosis Date    Asthma     Attention deficit hyperactivity disorder     GERD (gastroesophageal reflux disease)     Hypertension     intercranial hypertension     Pseudotumor cerebri     Psychiatric problem     Anxiety       Past Surgical History:   Procedure Laterality Date     SECTION N/A 2019     SECTION performed by Amelia Grant DO at [facility] L[facility]    CHOLECYSTECTOMY      2018    WISDOM TOOTH EXTRACTION  2014       Family History   Problem Relation Age of Onset    Depression Mother     Thyroid Disease Mother     Heart Disease Father     No Known Problems Sister     No Known Problems Brother     Anxiety Disorder Brother     Alzheimer's Disease Maternal Grandmother     Diabetes Maternal Grandmother     Heart Disease Maternal Grandfather     Alzheimer's Disease Maternal Grandfather     No Known Problems Paternal Grandmother     Lung Cancer Paternal Grandfather         black lung       Chief Complaint   Patient presents with    Gynecologic Exam     Yearly exam. Last pap 12/10/2019 negaitve, HPV not detected. PE:  Vital Signs  Blood pressure 130/80, height 6' (1.829 m), weight (!) 326 lb (147.9 kg), last menstrual period 04/14/2022, not currently breastfeeding. Estimated body mass index is 44.21 kg/m² as calculated from the following:    Height as of this encounter: 6' (1.829 m). Weight as of this encounter: 326 lb (147.9 kg). Labs:    No results found for this visit on 04/28/22. No data recorded    NURSE: Siria YUNG    HPI: here for annual gyn exam, denies c/o    Review of Systems   Constitutional: Negative. HENT: Negative for congestion. Respiratory: Negative for shortness of breath. Cardiovascular: Negative for chest pain. Gastrointestinal: Negative for abdominal pain. Genitourinary: Negative for dysuria. Musculoskeletal: Negative for back pain. Skin: Negative for rash. Neurological: Negative for headaches. Psychiatric/Behavioral: The patient is not nervous/anxious. no lymphadenopathy  Objective  Lymphatic:   no lymphadenopathy  Heent:   negative   Cor: regular rate and rhythm, no murmurs              Pul:clear to auscultation bilaterally- no wheezes, rales or rhonchi, normal air movement, no respiratory distress      GI: Abdomen soft, non-tender. BS normal. No masses,  No organomegaly           Extremities: normal strength, tone, and muscle mass   Breasts: Breast:normal appearance, no masses or tenderness   Pelvic Exam: GENITAL/URINARY:  External Genitalia:  General appearance; normal, Hair distribution; normal, Lesions absent  Urethral Meatus:  Size normal, Location normal, Lesions absent, Prolapse absent  Urethra:   Fullness absent, Masses absent  Bladder:  Fullness absent, Masses absent, Tenderness absent, Cystocele absent  Vagina:  General appearance normal, Estrogen effect normal, Discharge absent, Lesions absent, Pelvic support normal  Cervix:  General appearance normal, Lesions absent, Discharge absent, Tenderness absent, Enlargement absent, Nodularity absent  Uterus:  Size normal, Tenderness absent  Adenexa: Masses absent, Tenderness absent  Anus/Perineum:  Lesions absent and Masses absent                                                             Assessment and Plan          Diagnosis Orders   1. Encounter for annual routine gynecological examination               I am having Sheldon Short maintain her albuterol sulfate HFA, Prenatal MV-Min-Fe Fum-FA-DHA (PRENATAL 1 PO), pantoprazole, loratadine, FLUoxetine, colestipol, acetaZOLAMIDE, FLUoxetine, therapeutic multivitamin-minerals, and vitamin D. Return in about 1 year (around 4/28/2023) for yearly. She was also counseled on her preventative health maintenance recommendations and follow-up. There are no Patient Instructions on file for this visit.     ROSAURA Garsia CNM,4/30/2022 4:31 PM

## 2022-04-30 ASSESSMENT — ENCOUNTER SYMPTOMS
SHORTNESS OF BREATH: 0
ABDOMINAL PAIN: 0
BACK PAIN: 0

## 2023-05-04 ENCOUNTER — OFFICE VISIT (OUTPATIENT)
Dept: OBGYN | Age: 38
End: 2023-05-04
Payer: COMMERCIAL

## 2023-05-04 VITALS
SYSTOLIC BLOOD PRESSURE: 134 MMHG | WEIGHT: 293 LBS | HEIGHT: 72 IN | BODY MASS INDEX: 39.68 KG/M2 | DIASTOLIC BLOOD PRESSURE: 86 MMHG

## 2023-05-04 DIAGNOSIS — Z12.31 SCREENING MAMMOGRAM, ENCOUNTER FOR: ICD-10-CM

## 2023-05-04 DIAGNOSIS — Z01.419 ENCOUNTER FOR ANNUAL ROUTINE GYNECOLOGICAL EXAMINATION: Primary | ICD-10-CM

## 2023-05-04 DIAGNOSIS — N92.6 IRREGULAR MENSES: ICD-10-CM

## 2023-05-04 PROCEDURE — 99395 PREV VISIT EST AGE 18-39: CPT | Performed by: ADVANCED PRACTICE MIDWIFE

## 2023-05-04 RX ORDER — VILAZODONE HYDROCHLORIDE 10 MG/1
10 TABLET ORAL DAILY
COMMUNITY
Start: 2023-03-24

## 2023-05-04 RX ORDER — ATOMOXETINE 25 MG/1
25 CAPSULE ORAL DAILY
COMMUNITY
Start: 2023-04-01

## 2023-05-04 RX ORDER — MEDROXYPROGESTERONE ACETATE 5 MG/1
5 TABLET ORAL DAILY
Qty: 5 TABLET | Refills: 4 | Status: SHIPPED | OUTPATIENT
Start: 2023-05-04

## 2023-05-04 NOTE — PROGRESS NOTES
YEARLY PHYSICAL    Date of service: 2023    Boston Cao  Is a 40 y.o.  , female    PT's PCP is: Annalise Pérez DO     : 1985                                             Subjective:       Patient's last menstrual period was 2023 (approximate).      Are your menses regular: no    OB History    Para Term  AB Living   2 1 1   1 1   SAB IAB Ectopic Molar Multiple Live Births   1       0 1      # Outcome Date GA Lbr Wallace/2nd Weight Sex Delivery Anes PTL Lv   2 SAB 2021     SAB      1 Term 19 40w2d  9 lb 12.7 oz (4.442 kg) F CS-LTranv Spinal N HU        Social History     Tobacco Use   Smoking Status Never   Smokeless Tobacco Never        Social History     Substance and Sexual Activity   Alcohol Use No       Family History   Problem Relation Age of Onset    Depression Mother     Thyroid Disease Mother     Heart Disease Father     Anxiety Disorder Sister     No Known Problems Brother     Anxiety Disorder Brother     Alzheimer's Disease Maternal Grandmother     Diabetes Maternal Grandmother     Heart Disease Maternal Grandfather     Alzheimer's Disease Maternal Grandfather     No Known Problems Paternal Grandmother     Lung Cancer Paternal Grandfather         black lung       Any family history of breast or ovarian cancer: No    Any family history of blood clots: No    Have you had a positive covid test: Yes    Have you had the covid immunization: Yes      Allergies: Aspartame and phenylalanine      Current Outpatient Medications:     atomoxetine (STRATTERA) 25 MG capsule, Take 1 capsule by mouth daily, Disp: , Rfl:     vilazodone HCl (VIIBRYD) 10 MG TABS, Take 1 tablet by mouth daily with food, Disp: , Rfl:     medroxyPROGESTERone (PROVERA) 5 MG tablet, Take 1 tablet by mouth daily For 5 days for withdrawal bleed, Disp: 5 tablet, Rfl: 4    Multiple Vitamins-Minerals (THERAPEUTIC MULTIVITAMIN-MINERALS)

## 2023-05-05 ASSESSMENT — ENCOUNTER SYMPTOMS
SHORTNESS OF BREATH: 0
BACK PAIN: 0
ABDOMINAL PAIN: 0

## 2023-05-09 ENCOUNTER — TELEPHONE (OUTPATIENT)
Dept: OBGYN | Age: 38
End: 2023-05-09

## 2023-05-09 DIAGNOSIS — N64.4 BILATERAL MASTODYNIA: Primary | ICD-10-CM

## 2023-05-09 NOTE — TELEPHONE ENCOUNTER
Phone message from central scheduling. Patient was given order for screening mammogram however upon scheduling she advised that she has breast pain. Scheduling is asking for order for bilateral diagnostic mammogram and bilateral breast ultrasound.

## 2023-05-19 ENCOUNTER — HOSPITAL ENCOUNTER (OUTPATIENT)
Dept: ULTRASOUND IMAGING | Age: 38
End: 2023-05-19
Payer: COMMERCIAL

## 2023-05-19 ENCOUNTER — HOSPITAL ENCOUNTER (OUTPATIENT)
Dept: WOMENS IMAGING | Age: 38
Discharge: HOME | End: 2023-05-19
Payer: COMMERCIAL

## 2023-05-19 DIAGNOSIS — N64.4 BILATERAL MASTODYNIA: ICD-10-CM

## 2023-05-19 PROCEDURE — 76642 ULTRASOUND BREAST LIMITED: CPT

## 2023-05-19 PROCEDURE — G0279 TOMOSYNTHESIS, MAMMO: HCPCS

## 2024-04-04 ENCOUNTER — HOSPITAL ENCOUNTER (EMERGENCY)
Age: 39
Discharge: HOME | End: 2024-04-04
Attending: STUDENT IN AN ORGANIZED HEALTH CARE EDUCATION/TRAINING PROGRAM
Payer: COMMERCIAL

## 2024-04-04 VITALS
TEMPERATURE: 98.1 F | OXYGEN SATURATION: 98 % | WEIGHT: 293 LBS | DIASTOLIC BLOOD PRESSURE: 77 MMHG | HEIGHT: 71 IN | BODY MASS INDEX: 41.02 KG/M2 | RESPIRATION RATE: 18 BRPM | SYSTOLIC BLOOD PRESSURE: 129 MMHG | HEART RATE: 89 BPM

## 2024-04-04 DIAGNOSIS — G51.0 BELL'S PALSY: Primary | ICD-10-CM

## 2024-04-04 PROCEDURE — 6370000000 HC RX 637 (ALT 250 FOR IP): Performed by: STUDENT IN AN ORGANIZED HEALTH CARE EDUCATION/TRAINING PROGRAM

## 2024-04-04 PROCEDURE — 99283 EMERGENCY DEPT VISIT LOW MDM: CPT

## 2024-04-04 RX ORDER — FAMOTIDINE 20 MG/1
20 TABLET, FILM COATED ORAL NIGHTLY
COMMUNITY

## 2024-04-04 RX ORDER — PREDNISONE 50 MG/1
50 TABLET ORAL DAILY
Qty: 5 TABLET | Refills: 0 | Status: SHIPPED | OUTPATIENT
Start: 2024-04-04 | End: 2024-04-09

## 2024-04-04 RX ORDER — PREDNISONE 20 MG/1
60 TABLET ORAL ONCE
Status: COMPLETED | OUTPATIENT
Start: 2024-04-04 | End: 2024-04-04

## 2024-04-04 RX ORDER — PROPRANOLOL HCL 60 MG
60 CAPSULE, EXTENDED RELEASE 24HR ORAL DAILY
COMMUNITY
Start: 2024-03-04 | End: 2025-03-04

## 2024-04-04 RX ADMIN — PREDNISONE 60 MG: 20 TABLET ORAL at 21:42

## 2024-04-04 ASSESSMENT — LIFESTYLE VARIABLES
HOW MANY STANDARD DRINKS CONTAINING ALCOHOL DO YOU HAVE ON A TYPICAL DAY: PATIENT DOES NOT DRINK
HOW OFTEN DO YOU HAVE A DRINK CONTAINING ALCOHOL: NEVER

## 2024-04-04 ASSESSMENT — PAIN - FUNCTIONAL ASSESSMENT: PAIN_FUNCTIONAL_ASSESSMENT: NONE - DENIES PAIN

## 2024-04-05 NOTE — ED PROVIDER NOTES
daily 4/1/23   Ari Garcia MD   vilazodone HCl (VIIBRYD) 10 MG TABS Take 1 tablet by mouth daily with food  Patient not taking: Reported on 4/4/2024 3/24/23   Ari Garcia MD   medroxyPROGESTERone (PROVERA) 5 MG tablet Take 1 tablet by mouth daily For 5 days for withdrawal bleed 5/4/23   Amy Gould, APRN - CNM   Multiple Vitamins-Minerals (THERAPEUTIC MULTIVITAMIN-MINERALS) tablet Take 1 tablet by mouth daily    Ari Garcia MD   Cholecalciferol (VITAMIN D) 50 MCG (2000 UT) CAPS capsule Take 1 capsule by mouth daily    Ari Garcia MD   acetaZOLAMIDE (DIAMOX) 500 MG extended release capsule Take 250 mg by mouth daily    Ari Garcia MD   loratadine (CLARITIN) 10 MG tablet Take 1 tablet by mouth daily    Ari Garcia MD   pantoprazole (PROTONIX) 40 MG tablet Take 1 tablet by mouth daily    Ari Garcia MD   albuterol sulfate  (90 BASE) MCG/ACT inhaler Inhale 2 puffs into the lungs every 6 hours as needed for Wheezing    Ari Garcia MD         REVIEW OF SYSTEMS       Review of Systems   Neurological:  Positive for facial asymmetry and numbness.   All other systems reviewed and are negative.      PHYSICAL EXAM      INITIAL VITALS:   /77   Pulse 89   Temp 98.1 °F (36.7 °C) (Oral)   Resp 18   Ht 1.803 m (5' 11\")   Wt (!) 147.4 kg (325 lb)   LMP 04/04/2024 (Exact Date)   SpO2 98%   Breastfeeding No   BMI 45.33 kg/m²     Physical Exam  Vitals reviewed.   Constitutional:       Appearance: Normal appearance.   HENT:      Head: Normocephalic and atraumatic.      Nose: Nose normal.      Mouth/Throat:      Mouth: Mucous membranes are moist.      Pharynx: Oropharynx is clear.   Eyes:      Extraocular Movements: Extraocular movements intact.      Pupils: Pupils are equal, round, and reactive to light.   Cardiovascular:      Rate and Rhythm: Normal rate and regular rhythm.      Pulses: Normal pulses.      Heart sounds: Normal

## 2024-04-05 NOTE — DISCHARGE INSTRUCTIONS
Return to the emergency department if there are any new or worsening symptoms that are unexplained by the Bell's palsy.  If you want a reevaluation please not hesitate to return as well.

## 2024-05-09 ENCOUNTER — OFFICE VISIT (OUTPATIENT)
Dept: OBGYN | Age: 39
End: 2024-05-09
Payer: COMMERCIAL

## 2024-05-09 ENCOUNTER — HOSPITAL ENCOUNTER (OUTPATIENT)
Age: 39
Setting detail: SPECIMEN
Discharge: HOME | End: 2024-05-09
Payer: COMMERCIAL

## 2024-05-09 VITALS
BODY MASS INDEX: 39.68 KG/M2 | HEIGHT: 72 IN | SYSTOLIC BLOOD PRESSURE: 124 MMHG | WEIGHT: 293 LBS | DIASTOLIC BLOOD PRESSURE: 74 MMHG

## 2024-05-09 DIAGNOSIS — Z01.419 ENCOUNTER FOR ANNUAL ROUTINE GYNECOLOGICAL EXAMINATION: Primary | ICD-10-CM

## 2024-05-09 DIAGNOSIS — Z12.4 SCREENING FOR MALIGNANT NEOPLASM OF CERVIX: ICD-10-CM

## 2024-05-09 PROCEDURE — 99395 PREV VISIT EST AGE 18-39: CPT | Performed by: ADVANCED PRACTICE MIDWIFE

## 2024-05-09 PROCEDURE — 87624 HPV HI-RISK TYP POOLED RSLT: CPT

## 2024-05-09 SDOH — ECONOMIC STABILITY: HOUSING INSECURITY
IN THE LAST 12 MONTHS, WAS THERE A TIME WHEN YOU DID NOT HAVE A STEADY PLACE TO SLEEP OR SLEPT IN A SHELTER (INCLUDING NOW)?: NO

## 2024-05-09 SDOH — ECONOMIC STABILITY: INCOME INSECURITY: HOW HARD IS IT FOR YOU TO PAY FOR THE VERY BASICS LIKE FOOD, HOUSING, MEDICAL CARE, AND HEATING?: NOT HARD AT ALL

## 2024-05-09 SDOH — ECONOMIC STABILITY: FOOD INSECURITY: WITHIN THE PAST 12 MONTHS, THE FOOD YOU BOUGHT JUST DIDN'T LAST AND YOU DIDN'T HAVE MONEY TO GET MORE.: NEVER TRUE

## 2024-05-09 SDOH — ECONOMIC STABILITY: FOOD INSECURITY: WITHIN THE PAST 12 MONTHS, YOU WORRIED THAT YOUR FOOD WOULD RUN OUT BEFORE YOU GOT MONEY TO BUY MORE.: NEVER TRUE

## 2024-05-09 ASSESSMENT — PATIENT HEALTH QUESTIONNAIRE - PHQ9
SUM OF ALL RESPONSES TO PHQ QUESTIONS 1-9: 2
2. FEELING DOWN, DEPRESSED OR HOPELESS: SEVERAL DAYS
1. LITTLE INTEREST OR PLEASURE IN DOING THINGS: SEVERAL DAYS
SUM OF ALL RESPONSES TO PHQ9 QUESTIONS 1 & 2: 2
SUM OF ALL RESPONSES TO PHQ QUESTIONS 1-9: 2

## 2024-05-09 ASSESSMENT — ENCOUNTER SYMPTOMS
ABDOMINAL PAIN: 0
SHORTNESS OF BREATH: 0

## 2024-05-09 NOTE — PROGRESS NOTES
YEARLY PHYSICAL    Date of service: 2024    Boo Fields  Is a 38 y.o.  , female    PT's PCP is: Jose Rain DO     : 1985                                             Subjective:       Patient's last menstrual period was 2024 (approximate).     Are your menses regular: yes    OB History    Para Term  AB Living   2 1 1   1 1   SAB IAB Ectopic Molar Multiple Live Births   1       0 1      # Outcome Date GA Lbr Wallace/2nd Weight Sex Delivery Anes PTL Lv   2 SAB 2021     SAB      1 Term 19 40w2d  4.442 kg (9 lb 12.7 oz) F CS-LTranv Spinal N HU        Social History     Tobacco Use   Smoking Status Never   Smokeless Tobacco Never        Social History     Substance and Sexual Activity   Alcohol Use No       Family History   Problem Relation Age of Onset    Depression Mother     Thyroid Disease Mother     Heart Disease Father     Anxiety Disorder Sister     No Known Problems Brother     Anxiety Disorder Brother     Alzheimer's Disease Maternal Grandmother     Diabetes Maternal Grandmother     Heart Disease Maternal Grandfather     Alzheimer's Disease Maternal Grandfather     No Known Problems Paternal Grandmother     Lung Cancer Paternal Grandfather         black lung       Any family history of breast or ovarian cancer: No    Any family history of blood clots: No    Allergies: Vyvanse [lisdexamfetamine] and Aspartame and phenylalanine      Current Outpatient Medications:     famotidine (PEPCID) 20 MG tablet, Take 1 tablet by mouth at bedtime, Disp: , Rfl:     propranolol (INDERAL LA) 60 MG extended release capsule, Take 1 capsule by mouth daily, Disp: , Rfl:     atomoxetine (STRATTERA) 25 MG capsule, Take 1 capsule by mouth daily, Disp: , Rfl:     Multiple Vitamins-Minerals (THERAPEUTIC MULTIVITAMIN-MINERALS) tablet, Take 1 tablet by mouth daily, Disp: , Rfl:     Cholecalciferol (VITAMIN D) 50 MCG (

## 2024-05-11 LAB
HPV I/H RISK 4 DNA CVX QL NAA+PROBE: NOT DETECTED
HPV SAMPLE: NORMAL
HPV, INTERPRETATION: NORMAL
HPV16 DNA CVX QL NAA+PROBE: NOT DETECTED
HPV18 DNA CVX QL NAA+PROBE: NOT DETECTED
SPECIMEN DESCRIPTION: NORMAL

## 2024-05-22 LAB — CYTOLOGY REPORT: NORMAL

## 2024-06-20 ENCOUNTER — APPOINTMENT (OUTPATIENT)
Dept: GENERAL RADIOLOGY | Age: 39
End: 2024-06-20
Payer: COMMERCIAL

## 2024-06-20 ENCOUNTER — HOSPITAL ENCOUNTER (EMERGENCY)
Age: 39
Discharge: HOME | End: 2024-06-21
Attending: EMERGENCY MEDICINE
Payer: COMMERCIAL

## 2024-06-20 ENCOUNTER — ANESTHESIA (OUTPATIENT)
Dept: EMERGENCY DEPT | Age: 39
End: 2024-06-20
Payer: COMMERCIAL

## 2024-06-20 ENCOUNTER — ANESTHESIA EVENT (OUTPATIENT)
Dept: EMERGENCY DEPT | Age: 39
End: 2024-06-20
Payer: COMMERCIAL

## 2024-06-20 DIAGNOSIS — G97.1 SPINAL PUNCTURE HEADACHE: Primary | ICD-10-CM

## 2024-06-20 LAB
ANION GAP SERPL CALCULATED.3IONS-SCNC: 10 MMOL/L (ref 9–17)
BASOPHILS # BLD: 0.03 K/UL (ref 0–0.2)
BASOPHILS NFR BLD: 0 % (ref 0–2)
BUN SERPL-MCNC: 13 MG/DL (ref 6–20)
BUN/CREAT SERPL: 16 (ref 9–20)
CALCIUM SERPL-MCNC: 8.4 MG/DL (ref 8.6–10.4)
CHLORIDE SERPL-SCNC: 110 MMOL/L (ref 98–107)
CO2 SERPL-SCNC: 22 MMOL/L (ref 20–31)
CREAT SERPL-MCNC: 0.8 MG/DL (ref 0.5–0.9)
EOSINOPHIL # BLD: 0.08 K/UL (ref 0–0.44)
EOSINOPHILS RELATIVE PERCENT: 1 % (ref 1–4)
ERYTHROCYTE [DISTWIDTH] IN BLOOD BY AUTOMATED COUNT: 13.4 % (ref 11.8–14.4)
GFR, ESTIMATED: >90 ML/MIN/1.73M2
GLUCOSE SERPL-MCNC: 93 MG/DL (ref 70–99)
HCT VFR BLD AUTO: 36.2 % (ref 36.3–47.1)
HGB BLD-MCNC: 11.4 G/DL (ref 11.9–15.1)
IMM GRANULOCYTES # BLD AUTO: 0.03 K/UL (ref 0–0.3)
IMM GRANULOCYTES NFR BLD: 0 %
LYMPHOCYTES NFR BLD: 2.45 K/UL (ref 1.1–3.7)
LYMPHOCYTES RELATIVE PERCENT: 27 % (ref 24–43)
MCH RBC QN AUTO: 26.3 PG (ref 25.2–33.5)
MCHC RBC AUTO-ENTMCNC: 31.5 G/DL (ref 28.4–34.8)
MCV RBC AUTO: 83.4 FL (ref 82.6–102.9)
MONOCYTES NFR BLD: 0.72 K/UL (ref 0.1–1.2)
MONOCYTES NFR BLD: 8 % (ref 3–12)
NEUTROPHILS NFR BLD: 64 % (ref 36–65)
NEUTS SEG NFR BLD: 5.85 K/UL (ref 1.5–8.1)
NRBC BLD-RTO: 0 PER 100 WBC
PLATELET # BLD AUTO: 332 K/UL (ref 138–453)
PMV BLD AUTO: 8.7 FL (ref 8.1–13.5)
POTASSIUM SERPL-SCNC: 3.7 MMOL/L (ref 3.7–5.3)
RBC # BLD AUTO: 4.34 M/UL (ref 3.95–5.11)
SODIUM SERPL-SCNC: 142 MMOL/L (ref 135–144)
TROPONIN I SERPL HS-MCNC: <6 NG/L (ref 0–14)
WBC OTHER # BLD: 9.2 K/UL (ref 3.5–11.3)

## 2024-06-20 PROCEDURE — 36415 COLL VENOUS BLD VENIPUNCTURE: CPT

## 2024-06-20 PROCEDURE — 84484 ASSAY OF TROPONIN QUANT: CPT

## 2024-06-20 PROCEDURE — 6370000000 HC RX 637 (ALT 250 FOR IP): Performed by: NURSE PRACTITIONER

## 2024-06-20 PROCEDURE — 85025 COMPLETE CBC W/AUTO DIFF WBC: CPT

## 2024-06-20 PROCEDURE — 80048 BASIC METABOLIC PNL TOTAL CA: CPT

## 2024-06-20 PROCEDURE — 2580000003 HC RX 258: Performed by: NURSE PRACTITIONER

## 2024-06-20 PROCEDURE — 99285 EMERGENCY DEPT VISIT HI MDM: CPT

## 2024-06-20 PROCEDURE — 71045 X-RAY EXAM CHEST 1 VIEW: CPT

## 2024-06-20 PROCEDURE — 93005 ELECTROCARDIOGRAM TRACING: CPT | Performed by: EMERGENCY MEDICINE

## 2024-06-20 RX ORDER — 0.9 % SODIUM CHLORIDE 0.9 %
1000 INTRAVENOUS SOLUTION INTRAVENOUS ONCE
Status: COMPLETED | OUTPATIENT
Start: 2024-06-20 | End: 2024-06-20

## 2024-06-20 RX ORDER — VILAZODONE HYDROCHLORIDE 10 MG/1
10 TABLET ORAL DAILY
COMMUNITY

## 2024-06-20 RX ORDER — HYDROCODONE BITARTRATE AND ACETAMINOPHEN 5; 325 MG/1; MG/1
1 TABLET ORAL ONCE
Status: COMPLETED | OUTPATIENT
Start: 2024-06-20 | End: 2024-06-20

## 2024-06-20 RX ORDER — ACETAMINOPHEN 500 MG
1000 TABLET ORAL ONCE
Status: COMPLETED | OUTPATIENT
Start: 2024-06-20 | End: 2024-06-20

## 2024-06-20 RX ORDER — HYDROCODONE BITARTRATE AND ACETAMINOPHEN 5; 325 MG/1; MG/1
1 TABLET ORAL ONCE
Status: DISCONTINUED | OUTPATIENT
Start: 2024-06-20 | End: 2024-06-20

## 2024-06-20 RX ADMIN — HYDROCODONE BITARTRATE AND ACETAMINOPHEN 1 TABLET: 5; 325 TABLET ORAL at 19:47

## 2024-06-20 RX ADMIN — HYDROCODONE BITARTRATE AND ACETAMINOPHEN 1 TABLET: 5; 325 TABLET ORAL at 21:39

## 2024-06-20 RX ADMIN — SODIUM CHLORIDE 1000 ML: 9 INJECTION, SOLUTION INTRAVENOUS at 17:30

## 2024-06-20 RX ADMIN — ACETAMINOPHEN 1000 MG: 500 TABLET ORAL at 17:57

## 2024-06-20 RX ADMIN — SODIUM CHLORIDE 1000 ML: 9 INJECTION, SOLUTION INTRAVENOUS at 18:50

## 2024-06-20 ASSESSMENT — PAIN DESCRIPTION - INTENSITY: RATING_2: 8

## 2024-06-20 ASSESSMENT — PAIN DESCRIPTION - ORIENTATION
ORIENTATION: ANTERIOR
ORIENTATION_2: ANTERIOR
ORIENTATION: ANTERIOR;POSTERIOR

## 2024-06-20 ASSESSMENT — ENCOUNTER SYMPTOMS: BACK PAIN: 1

## 2024-06-20 ASSESSMENT — PAIN DESCRIPTION - LOCATION
LOCATION: CHEST
LOCATION: HEAD
LOCATION: HEAD
LOCATION_2: CHEST

## 2024-06-20 ASSESSMENT — PAIN SCALES - GENERAL
PAINLEVEL_OUTOF10: 3
PAINLEVEL_OUTOF10: 6
PAINLEVEL_OUTOF10: 4

## 2024-06-20 ASSESSMENT — PAIN DESCRIPTION - DESCRIPTORS
DESCRIPTORS_2: CRUSHING
DESCRIPTORS: ACHING

## 2024-06-20 NOTE — ED PROVIDER NOTES
[facility] ED  EMERGENCY DEPARTMENT ENCOUNTER      Pt Name: Boo Fields  MRN: [id number]  Birthdate 1985  Date of evaluation: 2024  Provider: ROSAURA Epps - CNP  10:09 PM    CHIEF COMPLAINT       Chief Complaint   Patient presents with    Chest Pain     Onset this AM to anterior chest that radiates to back and down right and left arms    Headache     Had spinal tap yesterday at [facility] to r/o MS. Started having HA prior to discharge, HA continues and is worsening.          HISTORY OF PRESENT ILLNESS        Boo Fields 38-year-old female presents from home to ED with complaint of chest pain. Onset this AM to anterior chest that radiates to back and down right and left arms.Headache.  Had spinal tap yesterday at [facility] to r/o MS. Started having HA prior to discharge, HA continues and is worsening.  Headache is frontal and over nose.      The history is provided by the patient. No  was used.       Nursing Notes were reviewed.    REVIEW OF SYSTEMS       Review of Systems   Cardiovascular:  Positive for chest pain.        Posterior chest wall radiating anterior   Musculoskeletal:  Positive for back pain.        Posterior chest wall   Neurological:  Positive for headaches.   All other systems reviewed and are negative.      Except as noted above the remainder of the review of systems was reviewed and negative.       PAST MEDICAL HISTORY     Past Medical History:   Diagnosis Date    Asthma     Attention deficit hyperactivity disorder     Bell's palsy     Depression     Diverticulitis large intestine     GERD (gastroesophageal reflux disease)     Hypertension     intercranial hypertension     Pseudotumor cerebri     Psychiatric problem     Anxiety         SURGICAL HISTORY       Past Surgical History:   Procedure Laterality Date     SECTION N/A 2019     SECTION performed by Jayleen Alonso DO at [facility] L&D OR    CHOLECYSTECTOMY

## 2024-06-21 VITALS
TEMPERATURE: 97.3 F | HEART RATE: 61 BPM | SYSTOLIC BLOOD PRESSURE: 121 MMHG | WEIGHT: 293 LBS | OXYGEN SATURATION: 96 % | HEIGHT: 70 IN | DIASTOLIC BLOOD PRESSURE: 71 MMHG | BODY MASS INDEX: 41.95 KG/M2 | RESPIRATION RATE: 17 BRPM

## 2024-06-21 LAB
EKG ATRIAL RATE: 65 BPM
EKG P AXIS: 19 DEGREES
EKG P-R INTERVAL: 156 MS
EKG Q-T INTERVAL: 422 MS
EKG QRS DURATION: 94 MS
EKG QTC CALCULATION (BAZETT): 438 MS
EKG R AXIS: 20 DEGREES
EKG T AXIS: 14 DEGREES
EKG VENTRICULAR RATE: 65 BPM

## 2024-06-21 PROCEDURE — 93010 ELECTROCARDIOGRAM REPORT: CPT | Performed by: INTERNAL MEDICINE

## 2024-06-21 NOTE — ANESTHESIA PROCEDURE NOTES
Epidural Block    Patient location during procedure: ED  Start time: 6/20/2024 10:48 AM  End time: 6/20/2024 10:57 AM  Reason for block: procedure for pain  Staffing  Resident/CRNA: Eli Ness APRN - CRNA  Performed by: Eli Ness APRN - CRNA  Authorized by: Eli Ness APRN - CRNA    Epidural  Patient position: sitting  Prep: ChloraPrep  Patient monitoring: continuous pulse ox, cardiac monitor and frequent blood pressure checks  Approach: midline  Location: L3-4  Injection technique: NARCISO saline  Provider prep: mask and sterile gloves  Needle  Needle type: Tuohy   Needle gauge: 17 G  Needle length: 3.5 in  Needle insertion depth: 85 cm  Catheter type: none  Assessment  Hemodynamics: stable  Attempts: 1  Outcomes: patient tolerated procedure well and uncomplicated  Additional Notes  Epidural space accessed and 20mL blood drawn under sterile technique. 20mL of blood injected into epidural space without complaint from patient. Epidural needle withdrawn and site covered with bandage. Pt instructed to remain lying flat for 1 hour and then slowly change position over the next hour. Pt instructed against heavy lifting or strenuous activity as it may dislodge patch. Pt verbalizes understanding, plan is for patient to be admitted to hospital.  Preanesthetic Checklist  Completed: patient identified, IV checked, site marked, risks and benefits discussed, surgical/procedural consents, equipment checked, pre-op evaluation, timeout performed, anesthesia consent given, oxygen available, monitors applied/VS acknowledged, fire risk safety assessment completed and verbalized and blood product R/B/A discussed and consented

## 2024-06-21 NOTE — ED PROVIDER NOTES
Patient care assumed from TIFFANIE Turcios at the conclusion of her clinical shift.  At that time patient was pending evaluation by anesthesia for blood patch.  Underwent blood patch without any apparent complication.  Symptoms improved.  Will discharge home.     Carmelo Greenwood MD  06/21/24 0057

## 2024-06-21 NOTE — ANESTHESIA POSTPROCEDURE EVALUATION
Department of Anesthesiology  Postprocedure Note    Patient: Boo Fields  MRN: [id number]  YOB: 1985  Date of evaluation: 6/20/2024    Procedure Summary       Date: 06/20/24 Room / Location:     Anesthesia Start:  Anesthesia Stop:     Procedure: Epidural Blood Patch Diagnosis:     Scheduled Providers:  Responsible Provider:     Anesthesia Type: Not recorded ASA Status: Not recorded            Anesthesia Type: No value filed.    Ar Phase I:      Ar Phase II:      Anesthesia Post Evaluation    Patient location during evaluation: ED  Patient participation: complete - patient participated  Level of consciousness: awake and alert  Pain score: 0  Airway patency: patent  Nausea & Vomiting: no vomiting and no nausea  Cardiovascular status: blood pressure returned to baseline  Respiratory status: acceptable  Hydration status: stable  Pain management: adequate    No notable events documented.

## 2024-08-22 ENCOUNTER — HOSPITAL ENCOUNTER (OUTPATIENT)
Age: 39
Discharge: HOME | End: 2024-08-24
Payer: COMMERCIAL

## 2024-08-22 ENCOUNTER — HOSPITAL ENCOUNTER (OUTPATIENT)
Dept: GENERAL RADIOLOGY | Age: 39
Discharge: HOME | End: 2024-08-24
Payer: COMMERCIAL

## 2024-08-22 DIAGNOSIS — M25.512 PAIN, JOINT, SHOULDER, LEFT: ICD-10-CM

## 2024-08-22 PROCEDURE — 73030 X-RAY EXAM OF SHOULDER: CPT

## 2025-05-15 ENCOUNTER — OFFICE VISIT (OUTPATIENT)
Dept: OBGYN | Age: 40
End: 2025-05-15
Payer: COMMERCIAL

## 2025-05-15 VITALS
BODY MASS INDEX: 39.68 KG/M2 | SYSTOLIC BLOOD PRESSURE: 126 MMHG | WEIGHT: 293 LBS | HEIGHT: 72 IN | DIASTOLIC BLOOD PRESSURE: 76 MMHG

## 2025-05-15 DIAGNOSIS — Z01.419 ENCOUNTER FOR ANNUAL ROUTINE GYNECOLOGICAL EXAMINATION: Primary | ICD-10-CM

## 2025-05-15 DIAGNOSIS — N93.0 POSTCOITAL BLEEDING: ICD-10-CM

## 2025-05-15 PROCEDURE — 99395 PREV VISIT EST AGE 18-39: CPT | Performed by: ADVANCED PRACTICE MIDWIFE

## 2025-05-15 RX ORDER — ALBUTEROL SULFATE 90 UG/1
2 INHALANT RESPIRATORY (INHALATION) EVERY 6 HOURS PRN
COMMUNITY
Start: 2024-12-16

## 2025-05-15 RX ORDER — PROPRANOLOL HYDROCHLORIDE 80 MG/1
CAPSULE, EXTENDED RELEASE ORAL
COMMUNITY
Start: 2025-02-08

## 2025-05-15 SDOH — ECONOMIC STABILITY: FOOD INSECURITY: WITHIN THE PAST 12 MONTHS, THE FOOD YOU BOUGHT JUST DIDN'T LAST AND YOU DIDN'T HAVE MONEY TO GET MORE.: NEVER TRUE

## 2025-05-15 SDOH — ECONOMIC STABILITY: FOOD INSECURITY: WITHIN THE PAST 12 MONTHS, YOU WORRIED THAT YOUR FOOD WOULD RUN OUT BEFORE YOU GOT MONEY TO BUY MORE.: NEVER TRUE

## 2025-05-15 ASSESSMENT — PATIENT HEALTH QUESTIONNAIRE - PHQ9
SUM OF ALL RESPONSES TO PHQ QUESTIONS 1-9: 1
2. FEELING DOWN, DEPRESSED OR HOPELESS: NOT AT ALL
1. LITTLE INTEREST OR PLEASURE IN DOING THINGS: SEVERAL DAYS
SUM OF ALL RESPONSES TO PHQ QUESTIONS 1-9: 1

## 2025-05-15 NOTE — PROGRESS NOTES
YEARLY PHYSICAL    Date of service: 5/15/2025    Boo Fields  Is a 39 y.o.  , female    PT's PCP is: Jose Rain DO     : 1985                                             Subjective:       Patient's last menstrual period was 2025 (approximate).     Are your menses regular: yes    OB History    Para Term  AB Living   2 1 1  1 1   SAB IAB Ectopic Molar Multiple Live Births   1    0 1      # Outcome Date GA Lbr Wallace/2nd Weight Sex Type Anes PTL Lv   2 SAB 2021     SAB      1 Term 19 40w2d  4.442 kg (9 lb 12.7 oz) F CS-LTranv Spinal N HU        Social History     Tobacco Use   Smoking Status Never   Smokeless Tobacco Never        Social History     Substance and Sexual Activity   Alcohol Use No       Family History   Problem Relation Age of Onset    Depression Mother     Thyroid Disease Mother     Heart Disease Father     Anxiety Disorder Sister     No Known Problems Brother     Anxiety Disorder Brother     Alzheimer's Disease Maternal Grandmother     Diabetes Maternal Grandmother     Heart Disease Maternal Grandfather     Alzheimer's Disease Maternal Grandfather     No Known Problems Paternal Grandmother     Lung Cancer Paternal Grandfather         black lung       Any family history of breast or ovarian cancer: No    Any family history of blood clots: No    Allergies: Vyvanse [lisdexamfetamine] and Aspartame and phenylalanine      Current Outpatient Medications:     albuterol sulfate HFA (PROVENTIL;VENTOLIN;PROAIR) 108 (90 Base) MCG/ACT inhaler, Inhale 2 puffs into the lungs every 6 hours as needed, Disp: , Rfl:     ocrelizumab (OCREVUS) 300 MG/10ML SOLN injection, Infuse intravenously, Disp: , Rfl:     mometasone-formoterol (DULERA) 100-5 MCG/ACT inhaler, Inhale 1 puff into the lungs, Disp: , Rfl:     propranolol (INDERAL LA) 80 MG extended release capsule, TAKE 1 CAPSULE BY MOUTH ONCE DAILY DO NOT

## 2025-05-16 ASSESSMENT — ENCOUNTER SYMPTOMS
SHORTNESS OF BREATH: 0
ABDOMINAL PAIN: 0

## 2025-07-14 ENCOUNTER — HOSPITAL ENCOUNTER (OUTPATIENT)
Dept: GENERAL RADIOLOGY | Age: 40
Discharge: HOME | End: 2025-07-16
Payer: COMMERCIAL

## 2025-07-14 DIAGNOSIS — R13.19 ESOPHAGEAL DYSPHAGIA: ICD-10-CM

## 2025-07-14 PROCEDURE — 74230 X-RAY XM SWLNG FUNCJ C+: CPT

## 2025-07-14 PROCEDURE — 92611 MOTION FLUOROSCOPY/SWALLOW: CPT

## 2025-07-14 NOTE — PROCEDURES
[facility]  SPEECH THERAPY  [facility] RADIOLOGY  Modified Barium Swallow    DIET ORDER RECOMMENDATIONS AFTER EVALUATION: Regular Solids, Thin Liquids     Date: 2025  Patient Name: Boo Fields      CSN: [id number]   : 1985  (39 y.o.)  Gender: female   Referring Physician:  CHLOE Ferrer  Diagnosis: R13.12 Oropharyngeal Dysphagia   History of Present Illness/Injury:    has a past medical history of Asthma, Attention deficit hyperactivity disorder, Autoimmune disorder, Bell's palsy, Breast disorder, Depression, Diverticulitis large intestine, GERD (gastroesophageal reflux disease), Hypertension, Migraine, Multiple sclerosis (HCC), Postpartum depression, Pseudotumor cerebri, and Psychiatric problem.  Current Diet: Regular Solids, Thin Liquids     Pain: No pain reported.    SUBJECTIVE:  Patient presents for modified barium swallow study. Patient reports a history of GERD, Donovan's Esophagus, and Multiple Sclerosis. She reports increased difficulty swallowing lately. She states that sometimes her \"brain forgets to swallow.\" She reports difficulty with both solids and liquids and has the sensation of foods/liquids being stuck in her throat and mid-thoracic area. She also feels that at times something in the back of her throat (base of tongue area) doesn't open. She reports that it feels like her swallowing muscles fatigue when eating and drinking.     OBJECTIVE:    Respiratory Status:  Room Air    Behavioral Observation:  Alert and Oriented    PATIENT WAS EVALUATED USING:  Barium:Thin Liquids, Puree, Soft Solids, and Coarse Solids    ORAL PHASE TREVOR SCORE: (Dysphagia outcome and severity scale)  6 = WFL/Modified Independent - Normal Diet - May have mild oral delay    PHARYNGEAL PHASE TREVOR SCORE: (Dysphagia outcome and severity scale)  5 = Mild Dysphagia - may need one consistency restricted - May have one or more of the following: Aspiration with thin - cough to clear, Airway

## 2025-07-28 ENCOUNTER — OFFICE VISIT (OUTPATIENT)
Dept: SURGERY | Age: 40
End: 2025-07-28
Payer: COMMERCIAL

## 2025-07-28 ENCOUNTER — TELEPHONE (OUTPATIENT)
Dept: SURGERY | Age: 40
End: 2025-07-28

## 2025-07-28 VITALS
WEIGHT: 293 LBS | BODY MASS INDEX: 47.06 KG/M2 | DIASTOLIC BLOOD PRESSURE: 80 MMHG | HEART RATE: 77 BPM | SYSTOLIC BLOOD PRESSURE: 115 MMHG

## 2025-07-28 DIAGNOSIS — R13.19 ESOPHAGEAL DYSPHAGIA: ICD-10-CM

## 2025-07-28 DIAGNOSIS — R13.12 OROPHARYNGEAL DYSPHAGIA: ICD-10-CM

## 2025-07-28 DIAGNOSIS — K21.9 GASTROESOPHAGEAL REFLUX DISEASE, UNSPECIFIED WHETHER ESOPHAGITIS PRESENT: Primary | ICD-10-CM

## 2025-07-28 PROCEDURE — 99203 OFFICE O/P NEW LOW 30 MIN: CPT | Performed by: SURGERY

## 2025-07-28 RX ORDER — METFORMIN HYDROCHLORIDE 500 MG/1
TABLET, EXTENDED RELEASE ORAL
COMMUNITY

## 2025-07-28 ASSESSMENT — ENCOUNTER SYMPTOMS
BACK PAIN: 1
ABDOMINAL PAIN: 0
DIARRHEA: 1
RHINORRHEA: 0
SHORTNESS OF BREATH: 0
CHEST TIGHTNESS: 0
NAUSEA: 0
VOMITING: 0
CHOKING: 1
TROUBLE SWALLOWING: 1
SORE THROAT: 0
COUGH: 0
VOICE CHANGE: 0
WHEEZING: 0
ABDOMINAL DISTENTION: 0
CONSTIPATION: 0

## 2025-07-28 NOTE — PATIENT INSTRUCTIONS
instead of two or three large meals.  After you eat, wait 2 to 3 hours before you lie down. Snacking close to bedtime can make your symptoms worse.  Avoid foods that make your symptoms worse. These may include chocolate, mint, alcohol, pepper, spicy foods, high-fat foods, or drinks with caffeine in them, such as tea, coffee, cyndi, or energy drinks. If your symptoms are worse after you eat a certain food, you may want to stop eating it to see if your symptoms get better.  Try to quit smoking or chewing tobacco, or cut back as much as you can. If you need help quitting, talk to your doctor about quit-tobacco programs and medicines. These can increase your chances of quitting for good.  If you have GERD symptoms while trying to sleep, raise the head of your bed 6 to 8 inches by putting the frame on blocks or placing a foam wedge under the head of your mattress. (Adding extra pillows does not work.)  Do not wear tight clothing around your middle.  When should you call for help?   Call 911  anytime you think you may need emergency care. For example, call if:    You passed out (lost consciousness).   Call your doctor now or seek immediate medical care if:    You have new or worse belly pain.     Your stools are black and tarlike or have streaks of blood.     You vomit blood.   Watch closely for changes in your health, and be sure to contact your doctor if:    Your symptoms have not improved after 2 weeks.     Food seems to catch in your throat or chest.   Where can you learn more?  Go to https://[email or web address].net/patientEd and enter T927 to learn more about \"Gastroesophageal Reflux Disease (GERD): Care Instructions.\"  Current as of: October 19, 2024  Content Version: 14.5  © 2271-6781 [employer].   Care instructions adapted under license by [employer]. If you have questions about a medical condition or this instruction, always ask your healthcare professional. [employer] disclaims any warranty

## 2025-07-28 NOTE — TELEPHONE ENCOUNTER
Patient scheduled for EGD on 9/12/25 with Dr. Smith. Written prep instructions reviewed with patient. Questions asked and answered. Patient also advised to read instructions again 1 week prior to the procedure and to call the office with any questions. Surgery department calls the day prior to the procedure to give time of arrival. Also made aware that the PAT department will reach out to review medical history and medication instructions about a week prior to procedure date. Case request sent

## 2025-07-28 NOTE — PROGRESS NOTES
Patient comes in with difficulty swallowing.   Meat and dry food bother her the most. She feels as if she gets fatigued swallowing.    She has pharyngeal symptoms; food getting stuck in the back of her mouth.  And symptoms of food getting stuck in her chest.  She has vomited to get relief.     On pantoprazole for a long time, and the famotidine 1 - 2 years ago. Has had GERD for many years.  Heart burn is currently under pretty good control.  Has infrequent water brash.   She avoids spicy food and dairy. Underwent double scope in 2023 at [facility].  She was told she had a Sánchez's at the time. And may have had diverticulitis.     Drinks water, sweet tea and lemonade  No tobacco, alcohol, coffee.    Diagnosed with MS about 1 year ago.    Both her mother and father have problems with swallowing.     Past Medical History:   Diagnosis Date    Asthma     Attention deficit hyperactivity disorder     Autoimmune disorder 2024    MS    Yun's palsy     Breast disorder lumps    Depression     Diverticulitis large intestine     GERD (gastroesophageal reflux disease)     Hypertension     intercranial hypertension     Migraine     Multiple sclerosis (HCC)     Postpartum depression     Pseudotumor cerebri     Psychiatric problem     Anxiety     Past Surgical History:   Procedure Laterality Date     SECTION N/A 2019     SECTION performed by Jayleen Alonso DO at [facility] L&D OR     SECTION  2019    CHOLECYSTECTOMY      2018    WISDOM TOOTH EXTRACTION       Current Outpatient Medications on File Prior to Visit   Medication Sig Dispense Refill    metFORMIN (GLUCOPHAGE-XR) 500 MG extended release tablet TAKE 1 TABLET BY MOUTH ONCE DAILY WITH DINNER FOR 14 DAYS. THEN TAKE 2 TABLETS DAILY WITH DINNER      albuterol sulfate HFA (PROVENTIL;VENTOLIN;PROAIR) 108 (90 Base) MCG/ACT inhaler Inhale 2 puffs into the lungs every 6 hours as needed      ocrelizumab

## 2025-07-29 PROBLEM — R13.10 DYSPHAGIA: Status: ACTIVE | Noted: 2025-07-28

## (undated) DEVICE — GLOVE SURG SZ 65 THK91MIL LTX FREE SYN POLYISOPRENE

## (undated) DEVICE — SUTURE VCRL SZ 3-0 L36IN ABSRB UD L36MM CT-1 1/2 CIR J944H

## (undated) DEVICE — PACK PROCEDURE SURG C SECT CUST STRL

## (undated) DEVICE — STAPLER EXT SKIN 35 WIDE S STL STPL SQUEEZE HNDL VISISTAT

## (undated) DEVICE — CHLORAPREP 26ML ORANGE

## (undated) DEVICE — SUTURE MCRYL + SZ 4 0 L18IN ABSRB UD PC 3 L16MM 3 8 CIR PRIM MCP845G

## (undated) DEVICE — TRAY CATH 16FR F INCLUDE LUB DRNGE BG STATLOK STBL DEV

## (undated) DEVICE — SUTURE ABSORBABLE BRAIDED 0 CT 36 IN DA UD VICRYL VCP958H

## (undated) DEVICE — LABEL SYS CORR MED

## (undated) DEVICE — SPONGE COUNTING BAG: Brand: DEVON

## (undated) DEVICE — COVER LT HNDL BLU PLAS

## (undated) DEVICE — 3M™ STERI-STRIP™ REINFORCED ADHESIVE SKIN CLOSURES, R1547, 1/2 IN X 4 IN (12 MM X 100 MM), 6 STRIPS/ENVELOPE: Brand: 3M™ STERI-STRIP™

## (undated) DEVICE — ELECTRODE ES AD CRD L15FT DISP FOR PT BELOW 30LB REM